# Patient Record
Sex: FEMALE | Race: BLACK OR AFRICAN AMERICAN | NOT HISPANIC OR LATINO | Employment: OTHER | URBAN - METROPOLITAN AREA
[De-identification: names, ages, dates, MRNs, and addresses within clinical notes are randomized per-mention and may not be internally consistent; named-entity substitution may affect disease eponyms.]

---

## 2023-03-14 LAB
ALBUMIN (G/DL) IN SER/PLAS: 4.1 G/DL (ref 3.4–5)
ANION GAP IN SER/PLAS: 14 MMOL/L (ref 10–20)
CALCIDIOL (25 OH VITAMIN D3) (NG/ML) IN SER/PLAS: 33 NG/ML
CALCIUM (MG/DL) IN SER/PLAS: 9.3 MG/DL (ref 8.6–10.6)
CARBON DIOXIDE, TOTAL (MMOL/L) IN SER/PLAS: 20 MMOL/L (ref 21–32)
CHLORIDE (MMOL/L) IN SER/PLAS: 100 MMOL/L (ref 98–107)
CREATININE (MG/DL) IN SER/PLAS: 1 MG/DL (ref 0.5–1.05)
ERYTHROCYTE DISTRIBUTION WIDTH (RATIO) BY AUTOMATED COUNT: 13.2 % (ref 11.5–14.5)
ERYTHROCYTE MEAN CORPUSCULAR HEMOGLOBIN CONCENTRATION (G/DL) BY AUTOMATED: 32.8 G/DL (ref 32–36)
ERYTHROCYTE MEAN CORPUSCULAR VOLUME (FL) BY AUTOMATED COUNT: 88 FL (ref 80–100)
ERYTHROCYTES (10*6/UL) IN BLOOD BY AUTOMATED COUNT: 3.96 X10E12/L (ref 4–5.2)
GFR FEMALE: 53 ML/MIN/1.73M2
GLUCOSE (MG/DL) IN SER/PLAS: 111 MG/DL (ref 74–99)
HEMATOCRIT (%) IN BLOOD BY AUTOMATED COUNT: 34.8 % (ref 36–46)
HEMOGLOBIN (G/DL) IN BLOOD: 11.4 G/DL (ref 12–16)
LEUKOCYTES (10*3/UL) IN BLOOD BY AUTOMATED COUNT: 9 X10E9/L (ref 4.4–11.3)
NRBC (PER 100 WBCS) BY AUTOMATED COUNT: 0 /100 WBC (ref 0–0)
PHOSPHATE (MG/DL) IN SER/PLAS: 2.7 MG/DL (ref 2.5–4.9)
PLATELETS (10*3/UL) IN BLOOD AUTOMATED COUNT: 415 X10E9/L (ref 150–450)
POTASSIUM (MMOL/L) IN SER/PLAS: 3.7 MMOL/L (ref 3.5–5.3)
SODIUM (MMOL/L) IN SER/PLAS: 130 MMOL/L (ref 136–145)
THYROTROPIN (MIU/L) IN SER/PLAS BY DETECTION LIMIT <= 0.05 MIU/L: 0.3 MIU/L (ref 0.44–3.98)
THYROXINE (T4) FREE (NG/DL) IN SER/PLAS: 1.08 NG/DL (ref 0.78–1.48)
TRIIODOTHYRONINE (T3) (NG/DL) IN SER/PLAS: 131 NG/DL (ref 60–200)
UREA NITROGEN (MG/DL) IN SER/PLAS: 6 MG/DL (ref 6–23)

## 2023-05-30 LAB
ALANINE AMINOTRANSFERASE (SGPT) (U/L) IN SER/PLAS: 8 U/L (ref 7–45)
ALBUMIN (G/DL) IN SER/PLAS: 4.2 G/DL (ref 3.4–5)
ALKALINE PHOSPHATASE (U/L) IN SER/PLAS: 101 U/L (ref 33–136)
ASPARTATE AMINOTRANSFERASE (SGOT) (U/L) IN SER/PLAS: 18 U/L (ref 9–39)
BILIRUBIN DIRECT (MG/DL) IN SER/PLAS: 0.1 MG/DL (ref 0–0.3)
BILIRUBIN TOTAL (MG/DL) IN SER/PLAS: 0.5 MG/DL (ref 0–1.2)
PROTEIN TOTAL: 7.2 G/DL (ref 6.4–8.2)
THYROTROPIN (MIU/L) IN SER/PLAS BY DETECTION LIMIT <= 0.05 MIU/L: 5.35 MIU/L (ref 0.44–3.98)
THYROXINE (T4) FREE (NG/DL) IN SER/PLAS: 0.88 NG/DL (ref 0.78–1.48)
TRIIODOTHYRONINE (T3) (NG/DL) IN SER/PLAS: 102 NG/DL (ref 60–200)

## 2023-09-05 ENCOUNTER — LAB (OUTPATIENT)
Dept: LAB | Facility: LAB | Age: 88
End: 2023-09-05
Payer: MEDICARE

## 2023-09-05 LAB
ALBUMIN (G/DL) IN SER/PLAS: 4 G/DL (ref 3.4–5)
ANION GAP IN SER/PLAS: 17 MMOL/L (ref 10–20)
CALCIUM (MG/DL) IN SER/PLAS: 9.5 MG/DL (ref 8.6–10.6)
CARBON DIOXIDE, TOTAL (MMOL/L) IN SER/PLAS: 19 MMOL/L (ref 21–32)
CHLORIDE (MMOL/L) IN SER/PLAS: 98 MMOL/L (ref 98–107)
CREATININE (MG/DL) IN SER/PLAS: 1.43 MG/DL (ref 0.5–1.05)
GFR FEMALE: 35 ML/MIN/1.73M2
GLUCOSE (MG/DL) IN SER/PLAS: 93 MG/DL (ref 74–99)
PHOSPHATE (MG/DL) IN SER/PLAS: 3.2 MG/DL (ref 2.5–4.9)
POTASSIUM (MMOL/L) IN SER/PLAS: 4.4 MMOL/L (ref 3.5–5.3)
SODIUM (MMOL/L) IN SER/PLAS: 130 MMOL/L (ref 136–145)
THYROTROPIN (MIU/L) IN SER/PLAS BY DETECTION LIMIT <= 0.05 MIU/L: 0.05 MIU/L (ref 0.44–3.98)
THYROXINE (T4) FREE (NG/DL) IN SER/PLAS: 1.28 NG/DL (ref 0.78–1.48)
UREA NITROGEN (MG/DL) IN SER/PLAS: 15 MG/DL (ref 6–23)

## 2023-10-13 PROBLEM — M85.80 OSTEOPENIA: Status: ACTIVE | Noted: 2023-10-13

## 2023-10-13 PROBLEM — E05.00 GRAVES DISEASE: Status: ACTIVE | Noted: 2023-10-13

## 2023-10-13 PROBLEM — G47.00 PERSISTENT INSOMNIA: Status: ACTIVE | Noted: 2023-10-13

## 2023-10-13 PROBLEM — I20.0 UNSTABLE ANGINA PECTORIS (MULTI): Status: ACTIVE | Noted: 2023-10-13

## 2023-10-13 PROBLEM — H61.20 IMPACTED CERUMEN: Status: ACTIVE | Noted: 2021-12-20

## 2023-10-13 PROBLEM — E05.00: Status: ACTIVE | Noted: 2021-11-29

## 2023-10-13 PROBLEM — D64.9 ANEMIA OF UNKNOWN ETIOLOGY: Status: ACTIVE | Noted: 2023-10-13

## 2023-10-13 PROBLEM — E03.2 HYPOTHYROIDISM DUE TO MEDICATION: Status: ACTIVE | Noted: 2023-10-13

## 2023-10-13 PROBLEM — E87.29 ACIDOSIS, HYPERCHLOREMIC: Status: ACTIVE | Noted: 2023-10-13

## 2023-10-13 PROBLEM — R63.4 ABNORMAL INTENTIONAL WEIGHT LOSS: Status: ACTIVE | Noted: 2023-10-13

## 2023-10-13 PROBLEM — R73.9 BORDERLINE HYPERGLYCEMIA: Status: ACTIVE | Noted: 2023-10-13

## 2023-10-13 PROBLEM — E05.20 TOXIC MULTINODULAR GOITER: Status: ACTIVE | Noted: 2023-10-13

## 2023-10-13 PROBLEM — E43 SEVERE PROTEIN-CALORIE MALNUTRITION (MULTI): Status: ACTIVE | Noted: 2022-01-21

## 2023-10-13 PROBLEM — R63.8 DECREASED ORAL INTAKE: Status: ACTIVE | Noted: 2023-10-13

## 2023-10-13 PROBLEM — E78.5 HYPERLIPIDEMIA: Status: ACTIVE | Noted: 2023-10-13

## 2023-10-13 PROBLEM — R00.1 BRADYCARDIA: Status: ACTIVE | Noted: 2023-10-13

## 2023-10-13 PROBLEM — E11.22 CHRONIC KIDNEY DISEASE DUE TO DIABETES MELLITUS (MULTI): Status: ACTIVE | Noted: 2021-11-29

## 2023-10-13 PROBLEM — R63.4 WEIGHT LOSS: Status: ACTIVE | Noted: 2023-10-13

## 2023-10-13 PROBLEM — I10 BENIGN ESSENTIAL HYPERTENSION: Status: ACTIVE | Noted: 2021-11-29

## 2023-10-13 PROBLEM — R26.9 GAIT DISTURBANCE: Status: ACTIVE | Noted: 2023-10-13

## 2023-10-13 PROBLEM — F01.50 VASCULAR DEMENTIA WITHOUT BEHAVIORAL DISTURBANCE (MULTI): Status: ACTIVE | Noted: 2023-10-13

## 2023-10-13 PROBLEM — E87.5 HYPERKALEMIA: Status: ACTIVE | Noted: 2023-10-13

## 2023-10-13 PROBLEM — G57.93 NEUROPATHY OF BOTH FEET: Status: ACTIVE | Noted: 2023-10-13

## 2023-10-13 PROBLEM — E55.9 VITAMIN D DEFICIENCY: Status: ACTIVE | Noted: 2023-10-13

## 2023-10-13 PROBLEM — H54.7 POOR VISION: Status: ACTIVE | Noted: 2023-10-13

## 2023-10-13 PROBLEM — N18.30 CHRONIC KIDNEY DISEASE (CKD), STAGE III (MODERATE) (MULTI): Status: ACTIVE | Noted: 2021-12-20

## 2023-10-13 RX ORDER — OFLOXACIN 3 MG/ML
1 SOLUTION/ DROPS OPHTHALMIC 4 TIMES DAILY
COMMUNITY
Start: 2016-06-07 | End: 2024-01-12 | Stop reason: ENTERED-IN-ERROR

## 2023-10-13 RX ORDER — ALCOHOL 70.47 ML/100ML
1 GEL TOPICAL DAILY
COMMUNITY
Start: 2021-12-01 | End: 2024-01-12 | Stop reason: ENTERED-IN-ERROR

## 2023-10-13 RX ORDER — ASPIRIN 81 MG/1
1 TABLET ORAL DAILY
COMMUNITY

## 2023-10-13 RX ORDER — LEVOTHYROXINE SODIUM 37.5 UG/ML
SOLUTION ORAL
COMMUNITY
Start: 2021-12-01 | End: 2024-01-12 | Stop reason: ENTERED-IN-ERROR

## 2023-10-13 RX ORDER — MULTIVIT,CALC,MINS/IRON/FOLIC 9MG-400MCG
1 TABLET ORAL DAILY
COMMUNITY
End: 2024-01-02

## 2023-10-13 RX ORDER — PREDNISOLONE ACETATE 10 MG/ML
1 SUSPENSION/ DROPS OPHTHALMIC 4 TIMES DAILY
COMMUNITY
Start: 2012-05-24 | End: 2024-01-12 | Stop reason: ENTERED-IN-ERROR

## 2023-10-13 RX ORDER — PILOCARPINE HYDROCHLORIDE 20 MG/ML
1 SOLUTION/ DROPS OPHTHALMIC DAILY
COMMUNITY
Start: 2012-12-06 | End: 2024-01-12 | Stop reason: ENTERED-IN-ERROR

## 2023-10-13 RX ORDER — PILOCARPINE HYDROCHLORIDE 10 MG/ML
1 SOLUTION/ DROPS OPHTHALMIC 4 TIMES DAILY
COMMUNITY
End: 2024-01-12 | Stop reason: ENTERED-IN-ERROR

## 2023-10-13 RX ORDER — TIMOLOL MALEATE 5 MG/ML
1 SOLUTION/ DROPS OPHTHALMIC DAILY
COMMUNITY
Start: 2020-05-29 | End: 2024-01-12 | Stop reason: ENTERED-IN-ERROR

## 2023-10-13 RX ORDER — METHIMAZOLE 5 MG/1
1 TABLET ORAL DAILY
COMMUNITY
End: 2024-02-01 | Stop reason: SDUPTHER

## 2023-10-13 RX ORDER — DIFLUPREDNATE OPHTHALMIC 0.5 MG/ML
1 EMULSION OPHTHALMIC 4 TIMES DAILY
COMMUNITY
Start: 2019-03-11 | End: 2024-01-12 | Stop reason: ENTERED-IN-ERROR

## 2023-10-13 RX ORDER — DEXAMETHASONE SODIUM PHOSPHATE 1 MG/ML
1 SOLUTION/ DROPS OPHTHALMIC
COMMUNITY
Start: 2019-04-26 | End: 2024-01-12 | Stop reason: ENTERED-IN-ERROR

## 2023-10-13 RX ORDER — TRAZODONE HYDROCHLORIDE 50 MG/1
50 TABLET ORAL NIGHTLY
COMMUNITY
End: 2024-01-12 | Stop reason: ENTERED-IN-ERROR

## 2023-10-13 RX ORDER — BIMATOPROST 0.1 MG/ML
SOLUTION/ DROPS OPHTHALMIC NIGHTLY
COMMUNITY
Start: 2012-02-12 | End: 2024-01-12 | Stop reason: ENTERED-IN-ERROR

## 2023-10-13 RX ORDER — BRIMONIDINE TARTRATE AND TIMOLOL MALEATE 2; 5 MG/ML; MG/ML
1 SOLUTION OPHTHALMIC 2 TIMES DAILY
COMMUNITY
Start: 2012-11-01 | End: 2024-01-12 | Stop reason: ENTERED-IN-ERROR

## 2023-10-13 RX ORDER — AMLODIPINE BESYLATE 5 MG/1
1.5 TABLET ORAL DAILY
COMMUNITY
End: 2023-10-16

## 2023-10-13 RX ORDER — MECLIZINE HCL 12.5 MG 12.5 MG/1
25 TABLET ORAL 3 TIMES DAILY
COMMUNITY
End: 2024-01-12 | Stop reason: ENTERED-IN-ERROR

## 2023-10-13 RX ORDER — LATANOPROST 50 UG/ML
1 SOLUTION/ DROPS OPHTHALMIC NIGHTLY
COMMUNITY
Start: 2016-09-21 | End: 2024-01-12 | Stop reason: ENTERED-IN-ERROR

## 2023-10-13 RX ORDER — LOTEPREDNOL ETABONATE 5 MG/ML
1 SUSPENSION/ DROPS OPHTHALMIC 4 TIMES DAILY
COMMUNITY
Start: 2012-05-14 | End: 2024-01-12 | Stop reason: ENTERED-IN-ERROR

## 2023-10-13 RX ORDER — LEVOTHYROXINE SODIUM 25 UG/1
50 TABLET ORAL DAILY
COMMUNITY
Start: 2022-03-29

## 2023-10-13 RX ORDER — FLUTICASONE PROPIONATE 50 MCG
2 SPRAY, SUSPENSION (ML) NASAL DAILY
COMMUNITY
Start: 2023-03-30 | End: 2024-01-12 | Stop reason: ENTERED-IN-ERROR

## 2023-10-13 RX ORDER — MIRTAZAPINE 15 MG/1
1 TABLET, FILM COATED ORAL NIGHTLY
COMMUNITY
Start: 2021-12-01 | End: 2024-01-12 | Stop reason: ENTERED-IN-ERROR

## 2023-10-14 DIAGNOSIS — I10 ESSENTIAL (PRIMARY) HYPERTENSION: ICD-10-CM

## 2023-10-16 RX ORDER — AMLODIPINE BESYLATE 5 MG/1
7.5 TABLET ORAL DAILY
Qty: 135 TABLET | Refills: 3 | Status: SHIPPED | OUTPATIENT
Start: 2023-10-16

## 2023-12-11 ENCOUNTER — TELEPHONE (OUTPATIENT)
Dept: GERIATRIC MEDICINE | Facility: CLINIC | Age: 88
End: 2023-12-11
Payer: MEDICARE

## 2023-12-11 ENCOUNTER — HOSPITAL ENCOUNTER (EMERGENCY)
Facility: HOSPITAL | Age: 88
Discharge: HOME | End: 2023-12-11
Attending: EMERGENCY MEDICINE
Payer: MEDICARE

## 2023-12-11 VITALS
HEART RATE: 74 BPM | OXYGEN SATURATION: 98 % | TEMPERATURE: 97.9 F | WEIGHT: 111 LBS | DIASTOLIC BLOOD PRESSURE: 82 MMHG | BODY MASS INDEX: 32.5 KG/M2 | RESPIRATION RATE: 16 BRPM | SYSTOLIC BLOOD PRESSURE: 155 MMHG

## 2023-12-11 DIAGNOSIS — I95.1 ORTHOSTASIS: Primary | ICD-10-CM

## 2023-12-11 LAB
ALBUMIN SERPL BCP-MCNC: 4.1 G/DL (ref 3.4–5)
ALP SERPL-CCNC: 109 U/L (ref 33–136)
ALT SERPL W P-5'-P-CCNC: 9 U/L (ref 7–45)
ANION GAP SERPL CALC-SCNC: 16 MMOL/L (ref 10–20)
AST SERPL W P-5'-P-CCNC: 21 U/L (ref 9–39)
BASOPHILS # BLD AUTO: 0.04 X10*3/UL (ref 0–0.1)
BASOPHILS NFR BLD AUTO: 0.5 %
BILIRUB SERPL-MCNC: 0.3 MG/DL (ref 0–1.2)
BUN SERPL-MCNC: 15 MG/DL (ref 6–23)
CALCIUM SERPL-MCNC: 9.5 MG/DL (ref 8.6–10.6)
CARDIAC TROPONIN I PNL SERPL HS: 7 NG/L (ref 0–34)
CHLORIDE SERPL-SCNC: 109 MMOL/L (ref 98–107)
CO2 SERPL-SCNC: 17 MMOL/L (ref 21–32)
CREAT SERPL-MCNC: 1.2 MG/DL (ref 0.5–1.05)
EOSINOPHIL # BLD AUTO: 0.26 X10*3/UL (ref 0–0.4)
EOSINOPHIL NFR BLD AUTO: 3.5 %
ERYTHROCYTE [DISTWIDTH] IN BLOOD BY AUTOMATED COUNT: 13.8 % (ref 11.5–14.5)
GFR SERPL CREATININE-BSD FRML MDRD: 43 ML/MIN/1.73M*2
GLUCOSE SERPL-MCNC: 103 MG/DL (ref 74–99)
HCT VFR BLD AUTO: 32.3 % (ref 36–46)
HGB BLD-MCNC: 11.3 G/DL (ref 12–16)
IMM GRANULOCYTES # BLD AUTO: 0.01 X10*3/UL (ref 0–0.5)
IMM GRANULOCYTES NFR BLD AUTO: 0.1 % (ref 0–0.9)
LYMPHOCYTES # BLD AUTO: 2.71 X10*3/UL (ref 0.8–3)
LYMPHOCYTES NFR BLD AUTO: 36.1 %
MAGNESIUM SERPL-MCNC: 2.31 MG/DL (ref 1.6–2.4)
MCH RBC QN AUTO: 28.8 PG (ref 26–34)
MCHC RBC AUTO-ENTMCNC: 35 G/DL (ref 32–36)
MCV RBC AUTO: 82 FL (ref 80–100)
MONOCYTES # BLD AUTO: 0.86 X10*3/UL (ref 0.05–0.8)
MONOCYTES NFR BLD AUTO: 11.5 %
NEUTROPHILS # BLD AUTO: 3.63 X10*3/UL (ref 1.6–5.5)
NEUTROPHILS NFR BLD AUTO: 48.3 %
NRBC BLD-RTO: 0 /100 WBCS (ref 0–0)
PLATELET # BLD AUTO: 318 X10*3/UL (ref 150–450)
POTASSIUM SERPL-SCNC: 4.8 MMOL/L (ref 3.5–5.3)
PROT SERPL-MCNC: 7.2 G/DL (ref 6.4–8.2)
RBC # BLD AUTO: 3.93 X10*6/UL (ref 4–5.2)
SODIUM SERPL-SCNC: 137 MMOL/L (ref 136–145)
T4 FREE SERPL-MCNC: 1.19 NG/DL (ref 0.78–1.48)
TSH SERPL-ACNC: 0.03 MIU/L (ref 0.44–3.98)
WBC # BLD AUTO: 7.5 X10*3/UL (ref 4.4–11.3)

## 2023-12-11 PROCEDURE — 2500000004 HC RX 250 GENERAL PHARMACY W/ HCPCS (ALT 636 FOR OP/ED): Performed by: STUDENT IN AN ORGANIZED HEALTH CARE EDUCATION/TRAINING PROGRAM

## 2023-12-11 PROCEDURE — 83735 ASSAY OF MAGNESIUM: CPT | Performed by: EMERGENCY MEDICINE

## 2023-12-11 PROCEDURE — 99284 EMERGENCY DEPT VISIT MOD MDM: CPT | Mod: 25 | Performed by: EMERGENCY MEDICINE

## 2023-12-11 PROCEDURE — 36415 COLL VENOUS BLD VENIPUNCTURE: CPT | Performed by: EMERGENCY MEDICINE

## 2023-12-11 PROCEDURE — 80048 BASIC METABOLIC PNL TOTAL CA: CPT | Performed by: EMERGENCY MEDICINE

## 2023-12-11 PROCEDURE — 96360 HYDRATION IV INFUSION INIT: CPT

## 2023-12-11 PROCEDURE — 82040 ASSAY OF SERUM ALBUMIN: CPT | Performed by: EMERGENCY MEDICINE

## 2023-12-11 PROCEDURE — 85025 COMPLETE CBC W/AUTO DIFF WBC: CPT | Performed by: EMERGENCY MEDICINE

## 2023-12-11 PROCEDURE — 84439 ASSAY OF FREE THYROXINE: CPT | Performed by: EMERGENCY MEDICINE

## 2023-12-11 PROCEDURE — 84484 ASSAY OF TROPONIN QUANT: CPT | Performed by: EMERGENCY MEDICINE

## 2023-12-11 PROCEDURE — 99283 EMERGENCY DEPT VISIT LOW MDM: CPT | Mod: 25

## 2023-12-11 PROCEDURE — 80053 COMPREHEN METABOLIC PANEL: CPT | Performed by: EMERGENCY MEDICINE

## 2023-12-11 PROCEDURE — 99285 EMERGENCY DEPT VISIT HI MDM: CPT | Performed by: EMERGENCY MEDICINE

## 2023-12-11 PROCEDURE — 84443 ASSAY THYROID STIM HORMONE: CPT | Performed by: EMERGENCY MEDICINE

## 2023-12-11 RX ADMIN — SODIUM CHLORIDE, SODIUM LACTATE, POTASSIUM CHLORIDE, AND CALCIUM CHLORIDE 1000 ML: 600; 310; 30; 20 INJECTION, SOLUTION INTRAVENOUS at 16:55

## 2023-12-11 ASSESSMENT — COLUMBIA-SUICIDE SEVERITY RATING SCALE - C-SSRS
6. HAVE YOU EVER DONE ANYTHING, STARTED TO DO ANYTHING, OR PREPARED TO DO ANYTHING TO END YOUR LIFE?: NO
2. HAVE YOU ACTUALLY HAD ANY THOUGHTS OF KILLING YOURSELF?: NO
1. IN THE PAST MONTH, HAVE YOU WISHED YOU WERE DEAD OR WISHED YOU COULD GO TO SLEEP AND NOT WAKE UP?: NO

## 2023-12-11 ASSESSMENT — PAIN - FUNCTIONAL ASSESSMENT: PAIN_FUNCTIONAL_ASSESSMENT: 0-10

## 2023-12-11 ASSESSMENT — PAIN SCALES - GENERAL: PAINLEVEL_OUTOF10: 0 - NO PAIN

## 2023-12-11 NOTE — TELEPHONE ENCOUNTER
Spoke with Pt's son (Joaquin) and daughter in law  (Mary) over the phone and blood pressure today 134/61 this morning and around 11:20 am b/p 142/66. Pt. With c/o feeling dizzy and lightheaded. Pt. takes amlodipine for blood pressure and per family Pt. is asking to go to the hospital and I advised family they can take her to the hospital of their choice. Family would like to know if you have any openings today to see Patient at the [facility].    Called and spoke with Pt's son and DAWITElias Ricarda CNP did not have an openings and per [facility] Pt was taken to the ER. Son states his sister took Pt to the main campus.

## 2023-12-11 NOTE — ED PROVIDER NOTES
History of Present Illness   CC: Dizziness and Weakness, Gen     History provided by: Patient  Limitations to History: None    HPI:  Jamila Eric is a 89 y.o. female with history of CKD, hypertension, mild hypokalemic metabolic acidosis on bicarb tabs, Graves' disease on levothyroxine resenting to the emergency department with 2 episodes of acute dizziness.  Since last night she got up to try and go to the bathroom got dizzy, laid down and her dizziness resolved.  This happened again this morning.  She decided come to the emergency department.  In the ED, she has not had recurrence of her dizziness even with standing up.  Denies any headache, chest pain, shortness of breath, abdominal pain, nausea, vomiting.  Currently feels well, at her baseline.  Unsure what medicine she takes.  Denies any allergies.    External Records Reviewed: Reviewed hospitalization in September 2023 for urinary retention.    Physical Exam   Triage vitals:  T 36.6 °C (97.9 °F)  HR 83  /70  RR 18  O2 98 % None (Room air)    Vital signs reviewed in nursing triage note, EMR flow sheets, and at patient's bedside.   General: Awake, alert, in no acute distress  Eyes: Gaze conjugate.  No scleral icterus or injection  HENT: Normo-cephalic, atraumatic. No stridor.  CV: Regular rate, Regular rhythm. Radial pulses 2+ bilaterally.  No clinical orthostasis upon standing.  Resp: Breathing non-labored, speaking in full sentences.  Clear to auscultation bilaterally  GI: Soft, non-distended, non-tender. No rebound or guarding.  MSK/Extremities: No gross bony deformities. Moving all extremities  Skin: Warm. Appropriate color  Neuro: Alert, oriented.  Speech fluent and non-dysarthric. Cranial nerves II through XII intact and symmetric bilaterally.  Full strength and sensation to light touch intact in the bilateral upper and lower extremities.  No ataxia on finger-to-nose bilaterally.  No truncal ataxia.  Patient ambulates with a non-ataxic gait.     Psych: Appropriate mood and affect    ED Course & Medical Decision Making   ED Course:  ED Course as of 12/12/23 1131   Mon Dec 11, 2023   1651 Comprehensive Metabolic Panel(!)  Mild metabolic acidosis with hyperchloremia.  Creatinine at baseline [SH]   1651 Magnesium  Mag normal [SH]   1651 Troponin I, High Sensitivity  Troponin normal [SH]   1651 CBC with Differential(!)  Blood cell count normal, mild anemia, normal platelets [SH]   1652 TSH with reflex to Free T4 if abnormal(!)  TSH is low consistent with known Graves' disease, currently awaiting free T4 level.  All [SH]   1703 Thyroxine, Free  Free T4 normal [SH]   1705 ECG 12 lead  EKG obtained at 1212 demonstrate normal sinus rhythm with rate of 71, normal axis, normal intervals.  T wave inversions noted in lead I, aVL, and V4 through V6.  These are unchanged compared to prior EKG from January 2022.  No ST segment elevations or depressions. [SH]      ED Course User Index  [SH] eJan Salcedo MD         Diagnoses as of 12/12/23 1131   Orthostasis       Differential diagnoses considered include but are not limited to: Orthostasis, dehydration, JOSÉ MIGUEL, posterior circulation stroke, near syncope    Social Determinants Limiting Care: None identified    MDM:  89 y.o. female presenting to the emergency department with 2 episodes of acute dizziness earlier today.  On arrival vital signs within normal limits, patient without any symptoms at the time my assessment.  Clinically without orthostasis, able to get out of bed and ambulate without difficulty.  Had labs obtained in triage which are reviewed as above, notable for low TSH concerning for medication noncompliance.  Does not have findings consistent with thyrotoxicosis at this time.  Denies no nausea, vomiting, diarrhea.  Does have mild acidosis with hyper chloremia on labs.  Will provide 1 L of IV fluids.     Remained stable in the emergency department, remained not orthostatic.  Tolerated IV fluids, continue to  feel well after this.  Requesting discharge home.  Feel this is appropriate.  Offered potential admission for near syncope workup which she declined.  Will discharge her home with instructions to follow-up with primary care doctor within this week.  Discussed return precautions.  Discharged in stable condition.    Disposition   As a result of the work-up, patient was discharged home.  They were informed of their diagnosis and instructed to come back with any concerns or worsening of condition and was agreeable to the plan as discussed above.  The patient was given the opportunity to ask questions.  All of the patient's questions were answered.  The patient remained stable under my care.    Procedures   Procedures    Patient seen and discussed with ED attending physician.    Jovi Salcedo MD  PGY 3 Emergency Medicine         Jean Salcedo MD  Resident  12/12/23 1100

## 2023-12-11 NOTE — DISCHARGE INSTRUCTIONS
Please be sure to stay well-hydrated.  Return to the ED if with any return of your symptoms.  Please follow-up with your regular doctor next week.

## 2023-12-31 DIAGNOSIS — I10 BENIGN ESSENTIAL HYPERTENSION: Primary | ICD-10-CM

## 2024-01-02 RX ORDER — MULTIVIT,CALC,MINS/IRON/FOLIC 9MG-400MCG
1 TABLET ORAL DAILY
Qty: 90 TABLET | Refills: 3 | Status: SHIPPED | OUTPATIENT
Start: 2024-01-02

## 2024-01-03 ENCOUNTER — HOSPITAL ENCOUNTER (EMERGENCY)
Facility: HOSPITAL | Age: 89
Discharge: HOME | End: 2024-01-03
Attending: STUDENT IN AN ORGANIZED HEALTH CARE EDUCATION/TRAINING PROGRAM
Payer: MEDICARE

## 2024-01-03 VITALS
TEMPERATURE: 97.9 F | DIASTOLIC BLOOD PRESSURE: 74 MMHG | OXYGEN SATURATION: 97 % | RESPIRATION RATE: 17 BRPM | WEIGHT: 120 LBS | SYSTOLIC BLOOD PRESSURE: 189 MMHG | HEIGHT: 60 IN | HEART RATE: 85 BPM | BODY MASS INDEX: 23.56 KG/M2

## 2024-01-03 DIAGNOSIS — N39.0 URINARY TRACT INFECTION WITHOUT HEMATURIA, SITE UNSPECIFIED: Primary | ICD-10-CM

## 2024-01-03 LAB
ALBUMIN SERPL BCP-MCNC: 4.4 G/DL (ref 3.4–5)
ALP SERPL-CCNC: 111 U/L (ref 33–136)
ALT SERPL W P-5'-P-CCNC: 7 U/L (ref 7–45)
ANION GAP SERPL CALC-SCNC: 13 MMOL/L (ref 10–20)
APPEARANCE UR: CLEAR
AST SERPL W P-5'-P-CCNC: 17 U/L (ref 9–39)
BACTERIA #/AREA URNS AUTO: ABNORMAL /HPF
BASOPHILS # BLD AUTO: 0.03 X10*3/UL (ref 0–0.1)
BASOPHILS NFR BLD AUTO: 0.4 %
BILIRUB SERPL-MCNC: 0.4 MG/DL (ref 0–1.2)
BILIRUB UR STRIP.AUTO-MCNC: NEGATIVE MG/DL
BUN SERPL-MCNC: 13 MG/DL (ref 6–23)
CALCIUM SERPL-MCNC: 10.3 MG/DL (ref 8.6–10.6)
CHLORIDE SERPL-SCNC: 103 MMOL/L (ref 98–107)
CO2 SERPL-SCNC: 18 MMOL/L (ref 21–32)
COLOR UR: ABNORMAL
CREAT SERPL-MCNC: 1.14 MG/DL (ref 0.5–1.05)
EOSINOPHIL # BLD AUTO: 0.14 X10*3/UL (ref 0–0.4)
EOSINOPHIL NFR BLD AUTO: 2.1 %
ERYTHROCYTE [DISTWIDTH] IN BLOOD BY AUTOMATED COUNT: 13.7 % (ref 11.5–14.5)
GFR SERPL CREATININE-BSD FRML MDRD: 46 ML/MIN/1.73M*2
GLUCOSE SERPL-MCNC: 105 MG/DL (ref 74–99)
GLUCOSE UR STRIP.AUTO-MCNC: NEGATIVE MG/DL
HCT VFR BLD AUTO: 35.1 % (ref 36–46)
HGB BLD-MCNC: 12.4 G/DL (ref 12–16)
IMM GRANULOCYTES # BLD AUTO: 0.02 X10*3/UL (ref 0–0.5)
IMM GRANULOCYTES NFR BLD AUTO: 0.3 % (ref 0–0.9)
KETONES UR STRIP.AUTO-MCNC: NEGATIVE MG/DL
LEUKOCYTE ESTERASE UR QL STRIP.AUTO: ABNORMAL
LYMPHOCYTES # BLD AUTO: 2.86 X10*3/UL (ref 0.8–3)
LYMPHOCYTES NFR BLD AUTO: 42.4 %
MAGNESIUM SERPL-MCNC: 2.09 MG/DL (ref 1.6–2.4)
MCH RBC QN AUTO: 28.8 PG (ref 26–34)
MCHC RBC AUTO-ENTMCNC: 35.3 G/DL (ref 32–36)
MCV RBC AUTO: 81 FL (ref 80–100)
MONOCYTES # BLD AUTO: 0.61 X10*3/UL (ref 0.05–0.8)
MONOCYTES NFR BLD AUTO: 9.1 %
NEUTROPHILS # BLD AUTO: 3.08 X10*3/UL (ref 1.6–5.5)
NEUTROPHILS NFR BLD AUTO: 45.7 %
NITRITE UR QL STRIP.AUTO: NEGATIVE
NRBC BLD-RTO: 0 /100 WBCS (ref 0–0)
PH UR STRIP.AUTO: 6 [PH]
PLATELET # BLD AUTO: 333 X10*3/UL (ref 150–450)
POTASSIUM SERPL-SCNC: 4.4 MMOL/L (ref 3.5–5.3)
PROT SERPL-MCNC: 7.5 G/DL (ref 6.4–8.2)
PROT UR STRIP.AUTO-MCNC: NEGATIVE MG/DL
RBC # BLD AUTO: 4.31 X10*6/UL (ref 4–5.2)
RBC # UR STRIP.AUTO: NEGATIVE /UL
RBC #/AREA URNS AUTO: ABNORMAL /HPF
SODIUM SERPL-SCNC: 130 MMOL/L (ref 136–145)
SP GR UR STRIP.AUTO: 1.01
SQUAMOUS #/AREA URNS AUTO: ABNORMAL /HPF
UROBILINOGEN UR STRIP.AUTO-MCNC: <2 MG/DL
WBC # BLD AUTO: 6.7 X10*3/UL (ref 4.4–11.3)
WBC #/AREA URNS AUTO: ABNORMAL /HPF

## 2024-01-03 PROCEDURE — 83735 ASSAY OF MAGNESIUM: CPT | Performed by: STUDENT IN AN ORGANIZED HEALTH CARE EDUCATION/TRAINING PROGRAM

## 2024-01-03 PROCEDURE — 85025 COMPLETE CBC W/AUTO DIFF WBC: CPT | Performed by: STUDENT IN AN ORGANIZED HEALTH CARE EDUCATION/TRAINING PROGRAM

## 2024-01-03 PROCEDURE — 99283 EMERGENCY DEPT VISIT LOW MDM: CPT

## 2024-01-03 PROCEDURE — 99284 EMERGENCY DEPT VISIT MOD MDM: CPT | Performed by: STUDENT IN AN ORGANIZED HEALTH CARE EDUCATION/TRAINING PROGRAM

## 2024-01-03 PROCEDURE — 36415 COLL VENOUS BLD VENIPUNCTURE: CPT | Performed by: STUDENT IN AN ORGANIZED HEALTH CARE EDUCATION/TRAINING PROGRAM

## 2024-01-03 PROCEDURE — 81001 URINALYSIS AUTO W/SCOPE: CPT | Performed by: STUDENT IN AN ORGANIZED HEALTH CARE EDUCATION/TRAINING PROGRAM

## 2024-01-03 PROCEDURE — 80053 COMPREHEN METABOLIC PANEL: CPT | Performed by: STUDENT IN AN ORGANIZED HEALTH CARE EDUCATION/TRAINING PROGRAM

## 2024-01-03 RX ORDER — SULFAMETHOXAZOLE AND TRIMETHOPRIM 800; 160 MG/1; MG/1
1 TABLET ORAL 2 TIMES DAILY
Qty: 28 TABLET | Refills: 0 | Status: SHIPPED | OUTPATIENT
Start: 2024-01-03 | End: 2024-01-13 | Stop reason: SINTOL

## 2024-01-03 RX ORDER — SULFAMETHOXAZOLE AND TRIMETHOPRIM 800; 160 MG/1; MG/1
1 TABLET ORAL ONCE
Status: DISCONTINUED | OUTPATIENT
Start: 2024-01-03 | End: 2024-01-03 | Stop reason: HOSPADM

## 2024-01-03 ASSESSMENT — COLUMBIA-SUICIDE SEVERITY RATING SCALE - C-SSRS
1. IN THE PAST MONTH, HAVE YOU WISHED YOU WERE DEAD OR WISHED YOU COULD GO TO SLEEP AND NOT WAKE UP?: NO
6. HAVE YOU EVER DONE ANYTHING, STARTED TO DO ANYTHING, OR PREPARED TO DO ANYTHING TO END YOUR LIFE?: NO
2. HAVE YOU ACTUALLY HAD ANY THOUGHTS OF KILLING YOURSELF?: NO

## 2024-01-03 NOTE — ED PROVIDER NOTES
HPI  Patient is a 89-year-old female with PMH of hypokalemic metabolic acidosis, HTN, CKD, Graves' disease, and history of urinary retention presenting to the emergency department for urinary retention.  Patient states that she has had 1 episode in the past where she had to have an intermittent catheter placed.  Has been able to follow-up with urology, however no abnormalities so far on her workup.  States that she had difficulty urinating today and did not feel that she was able to empty it prompting her presentation.  She denies any abdominal pain, nausea, vomiting, subjective fevers, chest pain, or shortness of breath.  Denies any numbness/tingle/weakness in her lower extremities.    Physical Exam  VITALS: Vital signs reviewed in nursing triage note, EMR flow sheets, and at patient's bedside  CONSTITUTIONAL: Well-appearing, in no apparent distress  HEAD: NCAT  EYES: PERRL, EOMI  NECK: Supple, non-tender, full ROM  CARD: RRR, no m/r/g, no JVD  RESP: LCTAB, speaking full sentences, no increased work of breathing, no use of accessory respiratory muscles  ABD: Bowel sounds present, non-distended, soft and non-tender, no palpable organomegaly, no masses, no guarding or rebound tenderness  BACK: No vertebral point or CVA tenderness, no obvious bony deformities, no spinal step-offs   EXT: Normal ROM in all four extremities, 2+ radial and DP pulses bilaterally, no edema  SKIN: Dry with appropriate turgor, no apparent rashes, suspicious lesions, or masses   NEURO: CNs II-XII grossly intact, AAOx4, sensation intact bilaterally, strength 5/5 on UEs and LEs bilaterally, speech is fluent and comprehensible  PSYCH: Appropriate mood and affect, no HI/SI, not responding to internal stimuli    Vitals:    01/03/24 1803   BP: (!) 189/74   Pulse: 85   Resp: 17   Temp:    SpO2: 97%       Assessment/Plan/MDM  Patient clinically stable with normal vital signs upon presentation to the emergency department.  While in triage, patient  actually was able to urinate.  Unfortunately were not able to get a urine sample at that time.  We did perform a postvoid residual which did demonstrate only 47 mL in the bladder.  Given her age and potential comorbidities, full laboratory workup including CBC, CMP, Agnesian, as well as UA to rule out any evidence of UTI causing her symptoms today.  Laboratory workup is notable for moderate leukocyte esterase as well as WBCs and pyuria that is suggestive of UTI.  Will provide with Bactrim and provided prescription.  Patient was able to urinate 1 more time while in the emergency department.  Encouraged her to follow-up with urology.  She understands these discharge instructions, and was provided with return precautions.  Will be discharged stable condition.    Results  *See section(s) entitled ``Lab Results´´, ``Diagnostic Imaging Results Review´´ for entirety.  Notable results listed below  - Labs: I independently interpreted as UA demonstrates moderate leukocyte esterase, 6-10 WBC, and 1+ bacteria, remainder of laboratory workup unremarkable    ED Course as of 01/03/24 1808 Wed Jan 03, 2024   1806 Leukocyte Esterase, Urine(!): MODERATE (2+) [JV]   1806 WBC, Urine(!): 6-10 [JV]   1806 Bacteria, Urine(!): 1+ [JV]      ED Course User Index  [JV] Jh Singleton MD         Diagnoses as of 01/03/24 1808   Urinary tract infection without hematuria, site unspecified      Clinical Impression  UTI    Dispo  Discharge home    Home: I discussed the differential, results and discharge plan with the patient and/or family/friend/caregiver if present. I emphasized the importance of follow-up with the physician I referred them to in the timeframe recommended. I explained reasons for the patient to return to the Emergency Department. Questions were addressed. They understand return precautions and discharge instructions. The patient and/or family/friend/caregiver expressed understanding and agreement with assessment/plan.      Patient seen and discussed with attending physician Dr. Blanca.    Jh Singleton MD  Emergency Medicine, PGY-3    Was dictated using Dragon dictation. Please excuse any errors found in the note.     Jh Singleton MD  Resident  01/03/24 8449

## 2024-01-03 NOTE — ED TRIAGE NOTES
Pt reports she is not urinating like normal. Denies pain. States she was able to urinate a little this morning but all of it wouldn't come out. This has happened to the pt before and they had to put a temporary catheter in.

## 2024-01-04 LAB — HOLD SPECIMEN: NORMAL

## 2024-01-12 ENCOUNTER — CLINICAL SUPPORT (OUTPATIENT)
Dept: EMERGENCY MEDICINE | Facility: HOSPITAL | Age: 89
End: 2024-01-12
Payer: MEDICARE

## 2024-01-12 ENCOUNTER — APPOINTMENT (OUTPATIENT)
Dept: RADIOLOGY | Facility: HOSPITAL | Age: 89
End: 2024-01-12
Payer: MEDICARE

## 2024-01-12 ENCOUNTER — HOSPITAL ENCOUNTER (OUTPATIENT)
Facility: HOSPITAL | Age: 89
Setting detail: OBSERVATION
Discharge: HOME | End: 2024-01-13
Attending: STUDENT IN AN ORGANIZED HEALTH CARE EDUCATION/TRAINING PROGRAM | Admitting: NURSE PRACTITIONER
Payer: MEDICARE

## 2024-01-12 DIAGNOSIS — N17.9 ACUTE RENAL FAILURE, UNSPECIFIED ACUTE RENAL FAILURE TYPE (CMS-HCC): ICD-10-CM

## 2024-01-12 DIAGNOSIS — E87.5 HYPERKALEMIA: Primary | ICD-10-CM

## 2024-01-12 DIAGNOSIS — R19.7 DIARRHEA, UNSPECIFIED TYPE: ICD-10-CM

## 2024-01-12 LAB
ALBUMIN SERPL BCP-MCNC: 4.4 G/DL (ref 3.4–5)
ALP SERPL-CCNC: 90 U/L (ref 33–136)
ALT SERPL W P-5'-P-CCNC: 10 U/L (ref 7–45)
ANION GAP BLDV CALCULATED.4IONS-SCNC: 15 MMOL/L (ref 10–25)
ANION GAP SERPL CALC-SCNC: 18 MMOL/L (ref 10–20)
AST SERPL W P-5'-P-CCNC: 22 U/L (ref 9–39)
ATRIAL RATE: 71 BPM
BASE EXCESS BLDV CALC-SCNC: -8 MMOL/L (ref -2–3)
BASOPHILS # BLD AUTO: 0.02 X10*3/UL (ref 0–0.1)
BASOPHILS NFR BLD AUTO: 0.3 %
BILIRUB SERPL-MCNC: 0.4 MG/DL (ref 0–1.2)
BODY TEMPERATURE: 37 DEGREES CELSIUS
BUN SERPL-MCNC: 27 MG/DL (ref 6–23)
CA-I BLDV-SCNC: 1.47 MMOL/L (ref 1.1–1.33)
CALCIUM SERPL-MCNC: 9.9 MG/DL (ref 8.6–10.6)
CARDIAC TROPONIN I PNL SERPL HS: 18 NG/L (ref 0–34)
CARDIAC TROPONIN I PNL SERPL HS: 20 NG/L (ref 0–34)
CHLORIDE BLDV-SCNC: 108 MMOL/L (ref 98–107)
CHLORIDE SERPL-SCNC: 110 MMOL/L (ref 98–107)
CO2 SERPL-SCNC: 13 MMOL/L (ref 21–32)
CREAT SERPL-MCNC: 2.32 MG/DL (ref 0.5–1.05)
EGFRCR SERPLBLD CKD-EPI 2021: 19 ML/MIN/1.73M*2
EOSINOPHIL # BLD AUTO: 0.01 X10*3/UL (ref 0–0.4)
EOSINOPHIL NFR BLD AUTO: 0.2 %
ERYTHROCYTE [DISTWIDTH] IN BLOOD BY AUTOMATED COUNT: 15.9 % (ref 11.5–14.5)
FLUAV RNA RESP QL NAA+PROBE: NOT DETECTED
FLUBV RNA RESP QL NAA+PROBE: NOT DETECTED
GLUCOSE BLDV-MCNC: 81 MG/DL (ref 74–99)
GLUCOSE SERPL-MCNC: 99 MG/DL (ref 74–99)
HCO3 BLDV-SCNC: 16.7 MMOL/L (ref 22–26)
HCT VFR BLD AUTO: 39.4 % (ref 36–46)
HCT VFR BLD EST: 37 % (ref 36–46)
HGB BLD-MCNC: 13.4 G/DL (ref 12–16)
HGB BLDV-MCNC: 12.3 G/DL (ref 12–16)
IMM GRANULOCYTES # BLD AUTO: 0.02 X10*3/UL (ref 0–0.5)
IMM GRANULOCYTES NFR BLD AUTO: 0.3 % (ref 0–0.9)
INHALED O2 CONCENTRATION: 21 %
LACTATE BLDV-SCNC: 1.6 MMOL/L (ref 0.4–2)
LYMPHOCYTES # BLD AUTO: 2.32 X10*3/UL (ref 0.8–3)
LYMPHOCYTES NFR BLD AUTO: 36.9 %
MAGNESIUM SERPL-MCNC: 2.18 MG/DL (ref 1.6–2.4)
MCH RBC QN AUTO: 28 PG (ref 26–34)
MCHC RBC AUTO-ENTMCNC: 34 G/DL (ref 32–36)
MCV RBC AUTO: 82 FL (ref 80–100)
MONOCYTES # BLD AUTO: 0.52 X10*3/UL (ref 0.05–0.8)
MONOCYTES NFR BLD AUTO: 8.3 %
NEUTROPHILS # BLD AUTO: 3.4 X10*3/UL (ref 1.6–5.5)
NEUTROPHILS NFR BLD AUTO: 54 %
NRBC BLD-RTO: 0 /100 WBCS (ref 0–0)
OXYHGB MFR BLDV: 91.6 % (ref 45–75)
P AXIS: 56 DEGREES
P OFFSET: 193 MS
P ONSET: 145 MS
PCO2 BLDV: 31 MM HG (ref 41–51)
PH BLDV: 7.34 PH (ref 7.33–7.43)
PHOSPHATE SERPL-MCNC: 3.3 MG/DL (ref 2.5–4.9)
PLATELET # BLD AUTO: 294 X10*3/UL (ref 150–450)
PO2 BLDV: 65 MM HG (ref 35–45)
POTASSIUM BLDV-SCNC: 6.3 MMOL/L (ref 3.5–5.3)
POTASSIUM SERPL-SCNC: 6.7 MMOL/L (ref 3.5–5.3)
POTASSIUM SERPL-SCNC: 7 MMOL/L (ref 3.5–5.3)
PR INTERVAL: 168 MS
PROT SERPL-MCNC: 7.9 G/DL (ref 6.4–8.2)
Q ONSET: 229 MS
QRS COUNT: 12 BEATS
QRS DURATION: 64 MS
QT INTERVAL: 358 MS
QTC CALCULATION(BAZETT): 389 MS
QTC FREDERICIA: 378 MS
R AXIS: 37 DEGREES
RBC # BLD AUTO: 4.79 X10*6/UL (ref 4–5.2)
SAO2 % BLDV: 94 % (ref 45–75)
SARS-COV-2 RNA RESP QL NAA+PROBE: NOT DETECTED
SODIUM BLDV-SCNC: 133 MMOL/L (ref 136–145)
SODIUM SERPL-SCNC: 134 MMOL/L (ref 136–145)
T AXIS: 160 DEGREES
T OFFSET: 408 MS
VENTRICULAR RATE: 71 BPM
WBC # BLD AUTO: 6.3 X10*3/UL (ref 4.4–11.3)

## 2024-01-12 PROCEDURE — 2500000004 HC RX 250 GENERAL PHARMACY W/ HCPCS (ALT 636 FOR OP/ED): Performed by: STUDENT IN AN ORGANIZED HEALTH CARE EDUCATION/TRAINING PROGRAM

## 2024-01-12 PROCEDURE — 2500000005 HC RX 250 GENERAL PHARMACY W/O HCPCS: Performed by: STUDENT IN AN ORGANIZED HEALTH CARE EDUCATION/TRAINING PROGRAM

## 2024-01-12 PROCEDURE — 82947 ASSAY GLUCOSE BLOOD QUANT: CPT | Mod: 59

## 2024-01-12 PROCEDURE — 84132 ASSAY OF SERUM POTASSIUM: CPT | Performed by: STUDENT IN AN ORGANIZED HEALTH CARE EDUCATION/TRAINING PROGRAM

## 2024-01-12 PROCEDURE — 70450 CT HEAD/BRAIN W/O DYE: CPT

## 2024-01-12 PROCEDURE — 81003 URINALYSIS AUTO W/O SCOPE: CPT | Performed by: STUDENT IN AN ORGANIZED HEALTH CARE EDUCATION/TRAINING PROGRAM

## 2024-01-12 PROCEDURE — 70450 CT HEAD/BRAIN W/O DYE: CPT | Performed by: RADIOLOGY

## 2024-01-12 PROCEDURE — 36415 COLL VENOUS BLD VENIPUNCTURE: CPT | Performed by: STUDENT IN AN ORGANIZED HEALTH CARE EDUCATION/TRAINING PROGRAM

## 2024-01-12 PROCEDURE — 2500000004 HC RX 250 GENERAL PHARMACY W/ HCPCS (ALT 636 FOR OP/ED)

## 2024-01-12 PROCEDURE — 94640 AIRWAY INHALATION TREATMENT: CPT | Mod: 59

## 2024-01-12 PROCEDURE — 99285 EMERGENCY DEPT VISIT HI MDM: CPT | Performed by: STUDENT IN AN ORGANIZED HEALTH CARE EDUCATION/TRAINING PROGRAM

## 2024-01-12 PROCEDURE — 93005 ELECTROCARDIOGRAM TRACING: CPT

## 2024-01-12 PROCEDURE — 84484 ASSAY OF TROPONIN QUANT: CPT | Performed by: STUDENT IN AN ORGANIZED HEALTH CARE EDUCATION/TRAINING PROGRAM

## 2024-01-12 PROCEDURE — 80053 COMPREHEN METABOLIC PANEL: CPT | Performed by: STUDENT IN AN ORGANIZED HEALTH CARE EDUCATION/TRAINING PROGRAM

## 2024-01-12 PROCEDURE — 71046 X-RAY EXAM CHEST 2 VIEWS: CPT

## 2024-01-12 PROCEDURE — 96375 TX/PRO/DX INJ NEW DRUG ADDON: CPT

## 2024-01-12 PROCEDURE — 83735 ASSAY OF MAGNESIUM: CPT | Performed by: STUDENT IN AN ORGANIZED HEALTH CARE EDUCATION/TRAINING PROGRAM

## 2024-01-12 PROCEDURE — 2500000002 HC RX 250 W HCPCS SELF ADMINISTERED DRUGS (ALT 637 FOR MEDICARE OP, ALT 636 FOR OP/ED): Performed by: STUDENT IN AN ORGANIZED HEALTH CARE EDUCATION/TRAINING PROGRAM

## 2024-01-12 PROCEDURE — 71046 X-RAY EXAM CHEST 2 VIEWS: CPT | Performed by: RADIOLOGY

## 2024-01-12 PROCEDURE — 87636 SARSCOV2 & INF A&B AMP PRB: CPT | Performed by: STUDENT IN AN ORGANIZED HEALTH CARE EDUCATION/TRAINING PROGRAM

## 2024-01-12 PROCEDURE — 96365 THER/PROPH/DIAG IV INF INIT: CPT

## 2024-01-12 PROCEDURE — 85025 COMPLETE CBC W/AUTO DIFF WBC: CPT | Performed by: STUDENT IN AN ORGANIZED HEALTH CARE EDUCATION/TRAINING PROGRAM

## 2024-01-12 PROCEDURE — 84100 ASSAY OF PHOSPHORUS: CPT | Performed by: STUDENT IN AN ORGANIZED HEALTH CARE EDUCATION/TRAINING PROGRAM

## 2024-01-12 RX ORDER — CALCIUM GLUCONATE 20 MG/ML
INJECTION, SOLUTION INTRAVENOUS
Status: COMPLETED
Start: 2024-01-12 | End: 2024-01-12

## 2024-01-12 RX ORDER — ALBUTEROL SULFATE 5 MG/ML
10 SOLUTION RESPIRATORY (INHALATION) ONCE
Status: DISCONTINUED | OUTPATIENT
Start: 2024-01-12 | End: 2024-01-13 | Stop reason: HOSPADM

## 2024-01-12 RX ORDER — CALCIUM GLUCONATE 20 MG/ML
2 INJECTION, SOLUTION INTRAVENOUS ONCE
Status: COMPLETED | OUTPATIENT
Start: 2024-01-12 | End: 2024-01-12

## 2024-01-12 RX ORDER — DEXTROSE 50 % IN WATER (D50W) INTRAVENOUS SYRINGE
25 ONCE
Status: COMPLETED | OUTPATIENT
Start: 2024-01-12 | End: 2024-01-12

## 2024-01-12 RX ORDER — INDOMETHACIN 25 MG/1
50 CAPSULE ORAL ONCE
Status: COMPLETED | OUTPATIENT
Start: 2024-01-12 | End: 2024-01-12

## 2024-01-12 RX ADMIN — DEXTROSE MONOHYDRATE 25 G: 25 INJECTION, SOLUTION INTRAVENOUS at 21:45

## 2024-01-12 RX ADMIN — SODIUM BICARBONATE 50 MEQ: 84 INJECTION, SOLUTION INTRAVENOUS at 21:45

## 2024-01-12 RX ADMIN — INSULIN HUMAN 5 UNITS: 100 INJECTION, SOLUTION PARENTERAL at 21:45

## 2024-01-12 RX ADMIN — CALCIUM GLUCONATE 2 G: 20 INJECTION, SOLUTION INTRAVENOUS at 21:30

## 2024-01-12 RX ADMIN — SODIUM ZIRCONIUM CYCLOSILICATE 15 G: 10 POWDER, FOR SUSPENSION ORAL at 23:15

## 2024-01-12 RX ADMIN — SODIUM CHLORIDE, POTASSIUM CHLORIDE, SODIUM LACTATE AND CALCIUM CHLORIDE 1000 ML: 600; 310; 30; 20 INJECTION, SOLUTION INTRAVENOUS at 21:45

## 2024-01-12 ASSESSMENT — LIFESTYLE VARIABLES
REASON UNABLE TO ASSESS: NO
EVER HAD A DRINK FIRST THING IN THE MORNING TO STEADY YOUR NERVES TO GET RID OF A HANGOVER: NO
HAVE YOU EVER FELT YOU SHOULD CUT DOWN ON YOUR DRINKING: NO
EVER FELT BAD OR GUILTY ABOUT YOUR DRINKING: NO
HAVE PEOPLE ANNOYED YOU BY CRITICIZING YOUR DRINKING: NO

## 2024-01-12 ASSESSMENT — PAIN SCALES - GENERAL: PAINLEVEL_OUTOF10: 0 - NO PAIN

## 2024-01-12 ASSESSMENT — PAIN - FUNCTIONAL ASSESSMENT: PAIN_FUNCTIONAL_ASSESSMENT: 0-10

## 2024-01-12 ASSESSMENT — COLUMBIA-SUICIDE SEVERITY RATING SCALE - C-SSRS
6. HAVE YOU EVER DONE ANYTHING, STARTED TO DO ANYTHING, OR PREPARED TO DO ANYTHING TO END YOUR LIFE?: NO
1. IN THE PAST MONTH, HAVE YOU WISHED YOU WERE DEAD OR WISHED YOU COULD GO TO SLEEP AND NOT WAKE UP?: NO
2. HAVE YOU ACTUALLY HAD ANY THOUGHTS OF KILLING YOURSELF?: NO

## 2024-01-12 ASSESSMENT — PAIN DESCRIPTION - PAIN TYPE: TYPE: ACUTE PAIN

## 2024-01-12 NOTE — ED TRIAGE NOTES
Pt. to ED via EMS. States she was diagnosed with a UTI last week and began feeling extremely weak today around 4 PM. Endorses no pain.

## 2024-01-12 NOTE — ED PROVIDER NOTES
"CC: Weakness, Gen     HPI:  Patient is a 89-year-old female with PMH of hypokalemic metabolic acidosis, HTN, CKD, Graves' disease, and urinary retention, presenting to the ED after syncopal event.  Son states that patient was in her normal state of health when she had a witnessed episode of \"falling out\" today prior to arrival.  States they were able to lower her to the ground did not hit her head.  Was not unconscious for more than a few seconds.  Denies any shaking or seizure-like activity.  Patient herself denies any symptoms including preceding chest pain, palpitations, lightheadedness or dizziness.  Son does mention that patient had diarrhea recently after starting an antibiotic.      Limitations to History: Dementia    Additional History Obtained from: Son    Records Reviewed: Recent available ED and inpatient notes reviewed in EMR.    PMHx/PSHx:  Per HPI.   - has a past medical history of Impacted cerumen, bilateral (07/13/2021), Impaired fasting glucose (05/20/2020), Nontoxic single thyroid nodule (07/06/2016), Nontoxic single thyroid nodule (07/06/2016), Other conditions influencing health status (11/25/2018), Personal history of other diseases of the female genital tract (11/10/2015), Personal history of other diseases of the nervous system and sense organs (07/11/2017), Personal history of other diseases of the respiratory system (10/23/2013), Personal history of other endocrine, nutritional and metabolic disease (10/25/2021), Personal history of other medical treatment (07/11/2016), Personal history of other specified conditions (03/09/2021), Personal history of other specified conditions (09/18/2013), Personal history of other specified conditions (01/28/2022), Personal history of other specified conditions (04/19/2021), Personal history of other specified conditions (01/28/2022), and Personal history of other specified conditions (07/13/2021).  - has a past surgical history that includes Other surgical " history (09/08/2020).    Medications: Reviewed in EMR. See EMR for complete list of medications and doses.    Allergies:  Bactrim [sulfamethoxazole-trimethoprim]    Social History:  - Tobacco:  has no history on file for tobacco use.   - Alcohol:  has no history on file for alcohol use.   - Illicit Drugs:  has no history on file for drug use.   ???????????????????????????????????????????????????????????????  Triage Vitals:  T 36.8 °C (98.3 °F)  HR 62  /73  RR 17  O2 95 % None (Room air)    PHYSICAL EXAM:   VS: As documented in the triage note and EMR flowsheet from this visit were reviewed.  Gen: Elderly female resting comfortably, not in acute distress  Eyes: EOMI. Clear scerla.  HENT: NC/AT, Mucosal membranes dry..   Neck: Supple, no cervical LAD  Resp: Non-labored breathing on RA, CTAB, no wheezes or crackles  CV: RRR, nl S1, S2, no murmurs  Abd: Soft, non-distended, non-tender, no rebound or guarding  Ext: no LE edema, pulses full and equal  Skin: WWP. No systemic rashes or lesions.  MSK:  no obvious joint swelling in extremities  Neuro:  AAOx3 to person, place, and time.  No facial droop. Facial movement and sensation intact. Uvula midline. Tongue protrudes midline, strong shoulder shrug and head turning against resistance.  5/5 strength at proximal and distal muscles in BUE and BLE. Sensation grossly intact to light touch in BUE and BLE.  Psych: Maintains eye contact, Appropriate mood and affect  ???????????????????????????????????????????????????????????????    ED Labs/Imaging:   Labs Reviewed   CBC WITH AUTO DIFFERENTIAL - Abnormal       Result Value    WBC 6.3      nRBC 0.0      RBC 4.79      Hemoglobin 13.4      Hematocrit 39.4      MCV 82      MCH 28.0      MCHC 34.0      RDW 15.9 (*)     Platelets 294      Neutrophils % 54.0      Immature Granulocytes %, Automated 0.3      Lymphocytes % 36.9      Monocytes % 8.3      Eosinophils % 0.2      Basophils % 0.3      Neutrophils Absolute 3.40       Immature Granulocytes Absolute, Automated 0.02      Lymphocytes Absolute 2.32      Monocytes Absolute 0.52      Eosinophils Absolute 0.01      Basophils Absolute 0.02     URINALYSIS WITH REFLEX CULTURE AND MICROSCOPIC - Abnormal    Color, Urine Yellow      Appearance, Urine Clear      Specific Gravity, Urine 1.013      pH, Urine 6.0      Protein, Urine 30 (1+) (*)     Glucose, Urine NEGATIVE      Blood, Urine NEGATIVE      Ketones, Urine NEGATIVE      Bilirubin, Urine NEGATIVE      Urobilinogen, Urine <2.0      Nitrite, Urine NEGATIVE      Leukocyte Esterase, Urine NEGATIVE     COMPREHENSIVE METABOLIC PANEL - Abnormal    Glucose 99      Sodium 134 (*)     Potassium 7.0 (*)     Chloride 110 (*)     Bicarbonate 13 (*)     Anion Gap 18      Urea Nitrogen 27 (*)     Creatinine 2.32 (*)     eGFR 19 (*)     Calcium 9.9      Albumin 4.4      Alkaline Phosphatase 90      Total Protein 7.9      AST 22      Bilirubin, Total 0.4      ALT 10     BLOOD GAS VENOUS FULL PANEL - Abnormal    POCT pH, Venous 7.34      POCT pCO2, Venous 31 (*)     POCT pO2, Venous 65 (*)     POCT SO2, Venous 94 (*)     POCT Oxy Hemoglobin, Venous 91.6 (*)     POCT Hematocrit Calculated, Venous 37.0      POCT Sodium, Venous 133 (*)     POCT Potassium, Venous 6.3 (*)     POCT Chloride, Venous 108 (*)     POCT Ionized Calicum, Venous 1.47 (*)     POCT Glucose, Venous 81      POCT Lactate, Venous 1.6      POCT Base Excess, Venous -8.0 (*)     POCT HCO3 Calculated, Venous 16.7 (*)     POCT Hemoglobin, Venous 12.3      POCT Anion Gap, Venous 15.0      Patient Temperature 37.0      FiO2 21     POTASSIUM - Abnormal    Potassium 6.7 (*)    RENAL FUNCTION PANEL - Abnormal    Glucose 84      Sodium 134 (*)     Potassium 5.5 (*)     Chloride 106      Bicarbonate 19 (*)     Anion Gap 15      Urea Nitrogen 22      Creatinine 1.97 (*)     eGFR 24 (*)     Calcium 10.2      Phosphorus 2.9      Albumin 4.2     RENAL FUNCTION PANEL - Abnormal    Glucose 76      Sodium  138      Potassium 4.4      Chloride 114 (*)     Bicarbonate 14 (*)     Anion Gap 14      Urea Nitrogen 18      Creatinine 1.53 (*)     eGFR 32 (*)     Calcium 7.8 (*)     Phosphorus 2.6      Albumin 3.1 (*)    URINALYSIS MICROSCOPIC WITH REFLEX CULTURE - Abnormal    WBC, Urine NONE      RBC, Urine 1-2      Hyaline Casts, Urine OCCASIONAL (*)    SARS-COV-2 PCR, SYMPTOMATIC - Normal    Coronavirus 2019, PCR Not Detected      Narrative:     This assay has received FDA Emergency Use Authorization (EUA) and is only authorized for the duration of time that circumstances exist to justify the authorization of the emergency use of in vitro diagnostic tests for the detection of SARS-CoV-2 virus and/or diagnosis of COVID-19 infection under section 564(b)(1) of the Act, 21 U.S.C. 360bbb-3(b)(1). This assay is an in vitro diagnostic nucleic acid amplification test for the qualitative detection of SARS-CoV-2 from nasopharyngeal specimens and has been validated for use at [facility]. Negative results do not preclude COVID-19 infections and should not be used as the sole basis for diagnosis, treatment, or other management decisions.     INFLUENZA A AND B PCR - Normal    Flu A Result Not Detected      Flu B Result Not Detected      Narrative:     This assay is an in vitro diagnostic multiplex nucleic acid amplification test for the detection and discrimination of Influenza A & B from nasopharyngeal specimens, and has been validated for use at [facility]. Negative results do not preclude Influenza A/B infections, and should not be used as the sole basis for diagnosis, treatment, or other management decisions. If Influenza A/B and RSV PCR results are negative, testing for Parainfluenza virus, Adenovirus and Metapneumovirus is routinely performed for [facility] pediatric oncology and intensive care inpatients, and is available on other patients by placing an add-on request.   MAGNESIUM - Normal     Magnesium 2.18     PHOSPHORUS - Normal    Phosphorus 3.3     TROPONIN I, HIGH SENSITIVITY - Normal    Troponin I, High Sensitivity 18      Narrative:     Less than 99th percentile of normal range cutoff-  Female and children under 18 years old <35 ng/L; Male <54 ng/L: Negative  Repeat testing should be performed if clinically indicated.     Female and children under 18 years old  ng/L; Male  ng/L:  Consistent with possible cardiac damage and possible increased clinical   risk. Serial measurements may help to assess extent of myocardial damage.     >120 ng/L: Consistent with cardiac damage, increased clinical risk and  myocardial infarction. Serial measurements may help assess extent of   myocardial damage.      NOTE: Children less than 1 year old may have higher baseline troponin   levels and results should be interpreted in conjunction with the overall   clinical context.    NOTE: Troponin I testing is performed using a different   testing methodology at [facility] than at other   [facility]. Direct result comparisons should only   be made within the same method.     SERIAL TROPONIN-INITIAL - Normal    Troponin I, High Sensitivity 20      Narrative:     Less than 99th percentile of normal range cutoff-  Female and children under 18 years old <35 ng/L; Male <54 ng/L: Negative  Repeat testing should be performed if clinically indicated.     Female and children under 18 years old  ng/L; Male  ng/L:  Consistent with possible cardiac damage and possible increased clinical   risk. Serial measurements may help to assess extent of myocardial damage.     >120 ng/L: Consistent with cardiac damage, increased clinical risk and  myocardial infarction. Serial measurements may help assess extent of   myocardial damage.      NOTE: Children less than 1 year old may have higher baseline troponin   levels and results should be interpreted in conjunction with the overall   clinical  context.    NOTE: Troponin I testing is performed using a different   testing methodology at [facility] than at other   [facility]. Direct result comparisons should only   be made within the same method.     SERIAL TROPONIN, 1 HOUR - Normal    Troponin I, High Sensitivity 18      Narrative:     Less than 99th percentile of normal range cutoff-  Female and children under 18 years old <35 ng/L; Male <54 ng/L: Negative  Repeat testing should be performed if clinically indicated.     Female and children under 18 years old  ng/L; Male  ng/L:  Consistent with possible cardiac damage and possible increased clinical   risk. Serial measurements may help to assess extent of myocardial damage.     >120 ng/L: Consistent with cardiac damage, increased clinical risk and  myocardial infarction. Serial measurements may help assess extent of   myocardial damage.      NOTE: Children less than 1 year old may have higher baseline troponin   levels and results should be interpreted in conjunction with the overall   clinical context.    NOTE: Troponin I testing is performed using a different   testing methodology at [facility] than at other   [facility]. Direct result comparisons should only   be made within the same method.     POCT GLUCOSE - Normal    POCT Glucose 98     TROPONIN SERIES- (INITIAL, 1 HR)    Narrative:     The following orders were created for panel order Troponin I Series, High Sensitivity (0, 1 HR).  Procedure                               Abnormality         Status                     ---------                               -----------         ------                     Troponin I, High Sensiti...[[id number]]  Normal              Final result               Troponin, High Sensitivi...[[id number]]  Normal              Final result                 Please view results for these tests on the individual orders.   URINE GRAY TUBE    Extra Tube Hold for add-ons.     URINALYSIS  WITH REFLEX CULTURE AND MICROSCOPIC    Narrative:     The following orders were created for panel order Urinalysis with Reflex Culture and Microscopic.  Procedure                               Abnormality         Status                     ---------                               -----------         ------                     Urinalysis with Reflex C...[[id number]]  Abnormal            Final result               Extra Urine Gray Tube[[id number]]                                                         Please view results for these tests on the individual orders.   EXTRA URINE GRAY TUBE   URINALYSIS WITH REFLEX CULTURE AND MICROSCOPIC    Narrative:     The following orders were created for panel order Urinalysis with Reflex Culture and Microscopic.  Procedure                               Abnormality         Status                     ---------                               -----------         ------                     Urinalysis with Reflex C...[[id number]]                                                 Extra Urine Gray Tube[[id number]]                                                         Please view results for these tests on the individual orders.   URINALYSIS WITH REFLEX CULTURE AND MICROSCOPIC   EXTRA URINE GRAY TUBE   POCT GLUCOSE METER     CT head wo IV contrast   Final Result   1.  No acute intracranial hemorrhage or acute territorial infarct.                  I personally reviewed the images/study and I agree with the findings   as stated.        MACRO:   None.        Signed by: Raysa Webster 1/12/2024 11:01 PM   Dictation workstation:   [id number]      XR chest 2 views   Final Result   No evidence of acute intrathoracic abnormality.        Signed by: Marvel Gasca 1/12/2024 5:25 PM   Dictation workstation:   [id number]            ED Course & [facility]   ED Course as of 01/13/24 1628   Fri Jan 12, 2024   1545 I independently interpreted the EKG:  Regular rate. Regular rhythm. Normal axis.   Normal UT, QRS, and  Qtc intervals.   No ST segment elevations, depressions. T wave inversions in leads aVL, V2, V4-V6 unchanged from previous.   Impression: NSR, no STEMI. [KR]   2016 Positive orthostats [KR]   Sat Jan 13, 2024   0025 Flu B Result: Not Detected [SEVEN]   0238 POTASSIUM(!!): 6.7  Potassium remains elevated.  Provided with Lasix.  Will recheck RFP. [JV]      ED Course User Index  [SEVEN] Cynthia Blanca MD  [JV] Jh Singleton MD  [KR] Hue Montenegro MD         Diagnoses as of 01/13/24 1628   Hyperkalemia   Diarrhea, unspecified type   Acute renal failure, unspecified acute renal failure type (CMS/HCC)           Medical Decision Making:  This is a 89-year-old female with above-stated history presenting to the ED for generalized weakness and syncopal episode.  Patient arrives hemodynamically stable and not in acute distress.  Patient does appear dehydrated with dry membranes.  Neuroexam intact.  Orthostatics at bedside grossly positive patient dropping to 70s over 50s upon standing.  Do believe that patient had an orthostatic syncopal episode but given age will require admission for further cardiac workup.  EKG was obtained and shows no signs of acute ischemia or arrhythmia.  Patient's labs notable for hyperkalemia at 7.0 and JOSÉ MIGUEL, with a compensated metabolic acidosis.  Repeat EKG shows no signs of hyperkalemic changes.  Patient moved into a monitored room and administered hyperkalemia cocktail and nephrology consult placed.  Do believe patient's JOSÉ MIGUEL and electrolyte abnormality secondary to dehydration IV fluid resuscitation initiated.  Patient handed off to oncoming provider pending repeat potassium and urinalysis as well as bladder scan.  Patient's son updated on the plan was agreeable for admission.  All questions were answered.      Social Determinants Limiting Care:  None identified    Disposition:  Patient was signed out at 2300 pending completion of their work-up.  Please see the next provider's transition  of care note for the remainder of the patient's care.    Patient seen and discussed with attending physician.    Hue Montenegro MD PGY3  Emergency Medicine      Procedures ? SmartLinks last updated 1/13/2024 4:28 PM          Hue Montenegro MD  Resident  01/13/24 1532

## 2024-01-13 ENCOUNTER — CLINICAL SUPPORT (OUTPATIENT)
Dept: EMERGENCY MEDICINE | Facility: HOSPITAL | Age: 89
End: 2024-01-13
Payer: MEDICARE

## 2024-01-13 VITALS
SYSTOLIC BLOOD PRESSURE: 127 MMHG | DIASTOLIC BLOOD PRESSURE: 90 MMHG | TEMPERATURE: 97.9 F | WEIGHT: 110 LBS | BODY MASS INDEX: 18.78 KG/M2 | HEART RATE: 77 BPM | OXYGEN SATURATION: 98 % | HEIGHT: 64 IN | RESPIRATION RATE: 17 BRPM

## 2024-01-13 LAB
ALBUMIN SERPL BCP-MCNC: 3.1 G/DL (ref 3.4–5)
ALBUMIN SERPL BCP-MCNC: 4.2 G/DL (ref 3.4–5)
ANION GAP SERPL CALC-SCNC: 14 MMOL/L (ref 10–20)
ANION GAP SERPL CALC-SCNC: 15 MMOL/L (ref 10–20)
APPEARANCE UR: CLEAR
ATRIAL RATE: 60 BPM
BILIRUB UR STRIP.AUTO-MCNC: NEGATIVE MG/DL
BUN SERPL-MCNC: 18 MG/DL (ref 6–23)
BUN SERPL-MCNC: 22 MG/DL (ref 6–23)
CALCIUM SERPL-MCNC: 10.2 MG/DL (ref 8.6–10.6)
CALCIUM SERPL-MCNC: 7.8 MG/DL (ref 8.6–10.6)
CARDIAC TROPONIN I PNL SERPL HS: 18 NG/L (ref 0–34)
CHLORIDE SERPL-SCNC: 106 MMOL/L (ref 98–107)
CHLORIDE SERPL-SCNC: 114 MMOL/L (ref 98–107)
CO2 SERPL-SCNC: 14 MMOL/L (ref 21–32)
CO2 SERPL-SCNC: 19 MMOL/L (ref 21–32)
COLOR UR: YELLOW
CREAT SERPL-MCNC: 1.53 MG/DL (ref 0.5–1.05)
CREAT SERPL-MCNC: 1.97 MG/DL (ref 0.5–1.05)
EGFRCR SERPLBLD CKD-EPI 2021: 24 ML/MIN/1.73M*2
EGFRCR SERPLBLD CKD-EPI 2021: 32 ML/MIN/1.73M*2
GLUCOSE BLD MANUAL STRIP-MCNC: 98 MG/DL (ref 74–99)
GLUCOSE SERPL-MCNC: 76 MG/DL (ref 74–99)
GLUCOSE SERPL-MCNC: 84 MG/DL (ref 74–99)
GLUCOSE UR STRIP.AUTO-MCNC: NEGATIVE MG/DL
HOLD SPECIMEN: NORMAL
HYALINE CASTS #/AREA URNS AUTO: ABNORMAL /LPF
KETONES UR STRIP.AUTO-MCNC: NEGATIVE MG/DL
LEUKOCYTE ESTERASE UR QL STRIP.AUTO: NEGATIVE
NITRITE UR QL STRIP.AUTO: NEGATIVE
P AXIS: 56 DEGREES
P OFFSET: 191 MS
P ONSET: 158 MS
PH UR STRIP.AUTO: 6 [PH]
PHOSPHATE SERPL-MCNC: 2.6 MG/DL (ref 2.5–4.9)
PHOSPHATE SERPL-MCNC: 2.9 MG/DL (ref 2.5–4.9)
POTASSIUM SERPL-SCNC: 4.4 MMOL/L (ref 3.5–5.3)
POTASSIUM SERPL-SCNC: 5.5 MMOL/L (ref 3.5–5.3)
PR INTERVAL: 144 MS
PROT UR STRIP.AUTO-MCNC: ABNORMAL MG/DL
Q ONSET: 230 MS
QRS COUNT: 10 BEATS
QRS DURATION: 74 MS
QT INTERVAL: 388 MS
QTC CALCULATION(BAZETT): 388 MS
QTC FREDERICIA: 388 MS
R AXIS: 15 DEGREES
RBC # UR STRIP.AUTO: NEGATIVE /UL
RBC #/AREA URNS AUTO: ABNORMAL /HPF
SODIUM SERPL-SCNC: 134 MMOL/L (ref 136–145)
SODIUM SERPL-SCNC: 138 MMOL/L (ref 136–145)
SP GR UR STRIP.AUTO: 1.01
T AXIS: 134 DEGREES
T OFFSET: 424 MS
UROBILINOGEN UR STRIP.AUTO-MCNC: <2 MG/DL
VENTRICULAR RATE: 60 BPM
WBC #/AREA URNS AUTO: ABNORMAL /HPF

## 2024-01-13 PROCEDURE — 36415 COLL VENOUS BLD VENIPUNCTURE: CPT | Performed by: STUDENT IN AN ORGANIZED HEALTH CARE EDUCATION/TRAINING PROGRAM

## 2024-01-13 PROCEDURE — G0378 HOSPITAL OBSERVATION PER HR: HCPCS

## 2024-01-13 PROCEDURE — 2500000004 HC RX 250 GENERAL PHARMACY W/ HCPCS (ALT 636 FOR OP/ED): Performed by: STUDENT IN AN ORGANIZED HEALTH CARE EDUCATION/TRAINING PROGRAM

## 2024-01-13 PROCEDURE — 84484 ASSAY OF TROPONIN QUANT: CPT | Performed by: STUDENT IN AN ORGANIZED HEALTH CARE EDUCATION/TRAINING PROGRAM

## 2024-01-13 PROCEDURE — 99222 1ST HOSP IP/OBS MODERATE 55: CPT | Performed by: NURSE PRACTITIONER

## 2024-01-13 PROCEDURE — 2500000001 HC RX 250 WO HCPCS SELF ADMINISTERED DRUGS (ALT 637 FOR MEDICARE OP): Performed by: NURSE PRACTITIONER

## 2024-01-13 PROCEDURE — 93005 ELECTROCARDIOGRAM TRACING: CPT

## 2024-01-13 PROCEDURE — 80069 RENAL FUNCTION PANEL: CPT | Performed by: STUDENT IN AN ORGANIZED HEALTH CARE EDUCATION/TRAINING PROGRAM

## 2024-01-13 PROCEDURE — 96361 HYDRATE IV INFUSION ADD-ON: CPT

## 2024-01-13 PROCEDURE — 96375 TX/PRO/DX INJ NEW DRUG ADDON: CPT

## 2024-01-13 PROCEDURE — 2500000004 HC RX 250 GENERAL PHARMACY W/ HCPCS (ALT 636 FOR OP/ED): Performed by: NURSE PRACTITIONER

## 2024-01-13 PROCEDURE — 80069 RENAL FUNCTION PANEL: CPT | Mod: MUE | Performed by: STUDENT IN AN ORGANIZED HEALTH CARE EDUCATION/TRAINING PROGRAM

## 2024-01-13 RX ORDER — METHIMAZOLE 5 MG/1
5 TABLET ORAL DAILY
Status: DISCONTINUED | OUTPATIENT
Start: 2024-01-13 | End: 2024-01-13 | Stop reason: HOSPADM

## 2024-01-13 RX ORDER — ACETAMINOPHEN 325 MG/1
650 TABLET ORAL EVERY 4 HOURS PRN
Status: DISCONTINUED | OUTPATIENT
Start: 2024-01-13 | End: 2024-01-13 | Stop reason: HOSPADM

## 2024-01-13 RX ORDER — ASPIRIN 81 MG/1
81 TABLET ORAL DAILY
Status: DISCONTINUED | OUTPATIENT
Start: 2024-01-13 | End: 2024-01-13 | Stop reason: HOSPADM

## 2024-01-13 RX ORDER — ACETAMINOPHEN 160 MG/5ML
650 SOLUTION ORAL EVERY 4 HOURS PRN
Status: DISCONTINUED | OUTPATIENT
Start: 2024-01-13 | End: 2024-01-13 | Stop reason: HOSPADM

## 2024-01-13 RX ORDER — SULFAMETHOXAZOLE AND TRIMETHOPRIM 800; 160 MG/1; MG/1
1 TABLET ORAL 2 TIMES DAILY
Status: DISCONTINUED | OUTPATIENT
Start: 2024-01-13 | End: 2024-01-13

## 2024-01-13 RX ORDER — ACETAMINOPHEN 500 MG
10 TABLET ORAL DAILY PRN
Status: DISCONTINUED | OUTPATIENT
Start: 2024-01-13 | End: 2024-01-13 | Stop reason: HOSPADM

## 2024-01-13 RX ORDER — POLYETHYLENE GLYCOL 3350 17 G/17G
17 POWDER, FOR SOLUTION ORAL DAILY PRN
Status: DISCONTINUED | OUTPATIENT
Start: 2024-01-13 | End: 2024-01-13 | Stop reason: HOSPADM

## 2024-01-13 RX ORDER — ACETAMINOPHEN 650 MG/1
650 SUPPOSITORY RECTAL EVERY 4 HOURS PRN
Status: DISCONTINUED | OUTPATIENT
Start: 2024-01-13 | End: 2024-01-13 | Stop reason: HOSPADM

## 2024-01-13 RX ORDER — LEVOTHYROXINE SODIUM 50 UG/1
50 TABLET ORAL DAILY
Status: DISCONTINUED | OUTPATIENT
Start: 2024-01-13 | End: 2024-01-13 | Stop reason: HOSPADM

## 2024-01-13 RX ORDER — FUROSEMIDE 10 MG/ML
20 INJECTION INTRAMUSCULAR; INTRAVENOUS ONCE
Status: COMPLETED | OUTPATIENT
Start: 2024-01-13 | End: 2024-01-13

## 2024-01-13 RX ORDER — SODIUM BICARBONATE 650 MG/1
650 TABLET ORAL EVERY 12 HOURS
Status: DISCONTINUED | OUTPATIENT
Start: 2024-01-13 | End: 2024-01-13 | Stop reason: HOSPADM

## 2024-01-13 RX ADMIN — ASPIRIN 81 MG: 81 TABLET, COATED ORAL at 08:04

## 2024-01-13 RX ADMIN — SODIUM BICARBONATE 650 MG: 650 TABLET ORAL at 08:03

## 2024-01-13 RX ADMIN — SODIUM CHLORIDE 1000 ML: 0.9 INJECTION, SOLUTION INTRAVENOUS at 00:35

## 2024-01-13 RX ADMIN — METHIMAZOLE 5 MG: 5 TABLET ORAL at 08:03

## 2024-01-13 RX ADMIN — LEVOTHYROXINE SODIUM 50 MCG: 50 TABLET ORAL at 08:04

## 2024-01-13 RX ADMIN — FUROSEMIDE 20 MG: 10 INJECTION, SOLUTION INTRAMUSCULAR; INTRAVENOUS at 00:35

## 2024-01-13 ASSESSMENT — ENCOUNTER SYMPTOMS
SHORTNESS OF BREATH: 0
VOMITING: 0
CHILLS: 0
HEMATURIA: 0
NAUSEA: 0
ABDOMINAL PAIN: 0
CONSTIPATION: 0
FEVER: 0
WEAKNESS: 1
FREQUENCY: 0
PALPITATIONS: 0
DIARRHEA: 0
DYSURIA: 0
FLANK PAIN: 0

## 2024-01-13 ASSESSMENT — PAIN SCALES - GENERAL
PAINLEVEL_OUTOF10: 0 - NO PAIN
PAINLEVEL_OUTOF10: 0 - NO PAIN

## 2024-01-13 NOTE — PROGRESS NOTES
Pharmacy Medication History Review    Jamila Eric is a 89 y.o. female admitted for No Principal Problem currently listed. Pharmacy reviewed the patient's [id number] medications and allergies for accuracy.    The list below reflects the updated PTA list. Comments regarding how patient may be taking medications differently can be found in the Admit Orders Activity  Prior to Admission Medications   Prescriptions Last Dose Informant Patient Reported? Taking?   Therems-M 9 mg iron-400 mcg tablet  Self, Child Yes Yes   Sig: TAKE 1 TABLET BY MOUTH EVERY DAY   amLODIPine (Norvasc) 5 mg tablet  Self, Child Yes Yes   Sig: TAKE 1 & 1/2 TABLET BY MOUTH EVERY DAY   aspirin 81 mg EC tablet  Self, Child Yes Yes   Sig: Take 1 tablet (81 mg) by mouth once daily.   levothyroxine (Synthroid, Levoxyl) 25 mcg tablet  Self, Child Yes Yes   Sig: Take 2 tablets (50 mcg) by mouth once daily.   methIMAzole (Tapazole) 5 mg tablet  Self, Child Yes Yes   Sig: Take 1 tablet (5 mg) by mouth once daily.   sodium bicarbonate 650 mg tablet Patient Unsure Self, Child Yes Unsure   Sig: TAKE 1 TABLET BY MOUTH EVERY 12 HOURS   **Last Filled 7/19/23 for 90 Day Supply   sulfamethoxazole-trimethoprim (Bactrim DS) 800-160 mg tablet Not Taking Self, Child Yes No   Sig: Take 1 tablet by mouth 2 times a day for 14 days.   **Last Filled 1/3/2024 for a 14 Day Supply      Facility-Administered Medications: None        The list below reflects the updated allergy list. Please review each documented allergy for additional clarification and justification.  Allergies  Reviewed by Pawel Wynn CPhT on 1/12/2024   No Known Allergies         Patient accepts M2B at discharge. Pharmacy has been updated to [facility].    Sources used to complete the med history include:  Patient interviewed with son at bedside about medications taking  [facility] fill history reviewed  [facility] Ambulatory medication list reviewed  Care Everywhere, [facility] medication list  reviewed  1/3/24  ED Discharge Summary medication list reviewed    Below are additional concerns with the patient's PTA list.  **Patient had a history of taking 1 Sodium Bicarbonate 650 mg tablet every 12 hours but is unsure if she is currently still using this medication    ---------------------------------  Pawel Wynn CPhT  Transitions of Care Technician  Medication reconciliation complete  Please reach out via [employer] Secure Chat for questions,   or if no response call [employer] or [employer].  [facility]

## 2024-01-13 NOTE — PROGRESS NOTES
Patient was handed off to me by Dr. Montenegro at 2300. For full history, physical, and prior ED course, please see previous provider note prior to patient handoff. This is an addendum to the record.     HOSPITAL COURSE/MEDICAL DECISION MAKING  In short, this is a 89-year-old female presenting to the emergency department for weakness.  Was found to be hyperkalemic with an JOSÉ MIGUEL.  Was recently seen in the ED for UTI and was sent home with antibiotics.  It does appear that this may have caused some diarrhea leading to dehydration and sepsis and JOSÉ MIGUEL.  Received hyperkalemic cocktail, including Lasix with appropriate urine output.  Potassium did come down from 7-5.  No EKG changes.  Will be admitted to medicine for management of JOSÉ MIGUEL as well as hyperkalemia. Throughout the ED stay, the patient was monitored and re-examined for any changes in stability or symptomatology.     ED Course as of 01/13/24 0431 Fri Jan 12, 2024   1545 I independently interpreted the EKG:  Regular rate. Regular rhythm. Normal axis.   Normal IN, QRS, and Qtc intervals.   No ST segment elevations, depressions. T wave inversions in leads aVL, V2, V4-V6 unchanged from previous.   Impression: NSR, no STEMI. [KR]   2016 Positive orthostats [KR]   Sat Jan 13, 2024   0025 Flu B Result: Not Detected [SEVEN]   0238 POTASSIUM(!!): 6.7  Potassium remains elevated.  Provided with Lasix.  Will recheck RFP. [JV]      ED Course User Index  [SEVEN] Cynthia Blanca MD  [JV] Jh Singleton MD  [KR] Hue Montenegro MD         Diagnoses as of 01/13/24 0431   Hyperkalemia   Diarrhea, unspecified type   Acute renal failure, unspecified acute renal failure type (CMS/[facility])       DIAGNOSIS  1.  Hyperkalemia  2.  JOSÉ MIGUEL    DISPOSITION  Admit to medicine with telemetry     I reviewed the patient´s case with Dr. Blanca who also saw the patient and agrees with the plan. The diagnosis and plan of care was also discussed with the patient. All the patient's questions were  answered. The patient was receptive and agreeable to the plan of care.     Jh Singleton MD  Emergency Medicine PGY-3    This note was dictated using dragon dictation.  Please excuse any errors found in the note.

## 2024-01-13 NOTE — DISCHARGE INSTRUCTIONS
Physician discharge instructions:    Dear Ms. Hernesto,     Thank you for allowing me to be a part of the team that provided your medical care during this hospital stay.  Here is a brief summary of what we found and instructions for you as you leave the hospital.    Reason(s) for hospital stay:  Hyperkalemia (high potassium levels)  Acute kidney injury  -These were likely caused by the Bactrim antibiotic you were taking for your bladder infection; I recommend you stop taking this altogether and avoid this in the future. Please also avoid high potassium foods (including potatoes, tomatoes, and bananas) for the next week. Please drink plenty of fluids, preferably water, for the next week, then return to your normal diet with fluid restriction of 1.5L per day.   -Please follow up with your primary care physician (PCP) within the next 14 days.    Discharge instructions:  - Please attend all follow-up appointments as scheduled.  - Please bring a medication list and all of your medications to your appointments.  - Please take all medications exactly as prescribed in these instructions.    If your symptoms return or worsen, please seek immediate medical attention.      Thank you for allowing me to be part of your care. I hope you continue to feel better soon.    Lindsey Gutierrez, DO  [facility]   Hospital Medicine    Please note: You were cared for by a hospitalist during your hospital stay. Once you are discharged, your primary care physician will handle any further medical issues. Please note that no refills for any discharge medications will be authorized once you are discharged, as it is imperative that you return to your primary care physician (or establish a relationship with a primary care physician if you do not have one) for your aftercare needs so that they can reassess your need for medications and monitor your lab values. Your primary care physician should be able to send refills for  medications after your follow up appointment.

## 2024-01-13 NOTE — H&P
History Of Present Illness  Jamila Eric is a 89 y.o. female with a past medical history of hypokalemic metabolic acidosis, HTN, CKD, Graves' disease, and history of urinary retention who presented to the ED with generalized weakness. Notably, the patient was seen and evaluated on 01/03/2024 for a UTI and was prescribed Bactrim. On exam in ED18, she reports feeling overall well, and is forgetful, however does appear to be a reliable historian. She denies any fever, chills, chest pain, palpitation, shortness of breath, nausea, vomiting, diarrhea, or urinary symptoms.      Past Medical History  Past Medical History:   Diagnosis Date    Impacted cerumen, bilateral 07/13/2021    Bilateral impacted cerumen    Impaired fasting glucose 05/20/2020    Fasting hyperglycemia    Nontoxic single thyroid nodule 07/06/2016    Left thyroid nodule    Nontoxic single thyroid nodule 07/06/2016    Right thyroid nodule    Other conditions influencing health status 11/25/2018    History of cough    Personal history of other diseases of the female genital tract 11/10/2015    History of breast pain    Personal history of other diseases of the nervous system and sense organs 07/11/2017    History of redness of eye    Personal history of other diseases of the respiratory system 10/23/2013    History of acute bronchitis    Personal history of other endocrine, nutritional and metabolic disease 10/25/2021    History of metabolic acidosis    Personal history of other medical treatment 07/11/2016    History of screening mammography    Personal history of other specified conditions 03/09/2021    History of memory loss    Personal history of other specified conditions 09/18/2013    History of postnasal drip    Personal history of other specified conditions 01/28/2022    History of urinary frequency    Personal history of other specified conditions 04/19/2021    History of dizziness    Personal history of other specified conditions 01/28/2022     "History of urinary retention    Personal history of other specified conditions 07/13/2021    History of balance disorder       Surgical History  Past Surgical History:   Procedure Laterality Date    OTHER SURGICAL HISTORY  09/08/2020    No history of surgery        Social History  She has no history on file for tobacco use, alcohol use, and drug use.    Family History  Family History   Problem Relation Name Age of Onset    Hypertension Sister          Allergies  Patient has no known allergies.    Review of Systems   Constitutional:  Negative for chills and fever.   Respiratory:  Negative for shortness of breath.    Cardiovascular:  Negative for chest pain and palpitations.   Gastrointestinal:  Negative for abdominal pain, constipation, diarrhea, nausea and vomiting.   Genitourinary:  Negative for dysuria, flank pain, frequency, hematuria and urgency.   Neurological:  Positive for weakness.   All other systems reviewed and are negative.       Physical Exam  Vitals reviewed.   HENT:      Head: Normocephalic and atraumatic.   Eyes:      Comments: Right eye clouded   Cardiovascular:      Rate and Rhythm: Normal rate and regular rhythm.      Heart sounds: Normal heart sounds.   Pulmonary:      Effort: Pulmonary effort is normal.      Breath sounds: Normal air entry.   Abdominal:      General: Bowel sounds are normal.      Palpations: Abdomen is soft.      Tenderness: There is no abdominal tenderness.   Musculoskeletal:         General: No deformity.   Skin:     General: Skin is warm and dry.   Neurological:      General: No focal deficit present.      Mental Status: She is alert and oriented to person, place, and time.   Psychiatric:         Mood and Affect: Mood normal.         Behavior: Behavior normal.          Last Recorded Vitals  Blood pressure 150/71, pulse 83, temperature 36.8 °C (98.3 °F), temperature source Oral, resp. rate 18, height 1.626 m (5' 4\"), weight 49.9 kg (110 lb), SpO2 93 %.    Relevant " Results      Lab Results   Component Value Date    WBC 6.3 01/12/2024    HGB 13.4 01/12/2024    HCT 39.4 01/12/2024    MCV 82 01/12/2024     01/12/2024     Lab Results   Component Value Date    GLUCOSE 84 01/13/2024    CALCIUM 10.2 01/13/2024     (L) 01/13/2024    K 5.5 (H) 01/13/2024    CO2 19 (L) 01/13/2024     01/13/2024    BUN 22 01/13/2024    CREATININE 1.97 (H) 01/13/2024     ED Medication Administration from 01/12/2024 1450 to 01/13/2024 0506         Date/Time Order Dose Route Action Action by     01/12/2024 2130 EST albuterol nebulizer solution 10 mg 10 mg nebulization Not Given [facility],      01/12/2024 2130 EST calcium gluconate in NS IV 2 g 2 g intravenous New Bag [facility], S     01/12/2024 2145 EST dextrose 50 % injection 25 g 25 g intravenous Given [facility], S     01/12/2024 2145 EST insulin regular (HumuLIN R) injection 5 Units 5 Units intravenous Given [facility]     01/12/2024 2145 EST lactated Ringer's bolus 1,000 mL 1,000 mL intravenous New Bag [facility], S     01/12/2024 2145 EST sodium bicarbonate 1 mEq/mL (8.4 %) injection 50 mEq 50 mEq intravenous Given [facility]     01/12/2024 2230 EST calcium gluconate in NS IV 2 g 0 g intravenous Stopped Rub,      01/12/2024 2245 EST lactated Ringer's bolus 1,000 mL 0 mL intravenous Stopped Rubel,      01/12/2024 2315 EST sodium zirconium cyclosilicate (Lokelma) packet 15 g 15 g oral Given Rubel, J     01/13/2024 0035 EST furosemide (Lasix) injection 20 mg 20 mg intravenous Given Rub, J     01/13/2024 0035 EST sodium chloride 0.9 % bolus 1,000 mL 1,000 mL intravenous New Bag Rubel, J     01/13/2024 0135 EST sodium chloride 0.9 % bolus 1,000 mL 0 mL intravenous Stopped Rubel, J     01/13/2024 0450 EST sodium bicarbonate tablet 650 mg 650 mg oral Not Given NORM Parker               Assessment/Plan   Principal Problem:    Hyperkalemia      #Hyperkalemia  -Highly likely ISO Bactrim and impaired renal function  -Stop Bactrim  -Given K+ cocktail in  ED  -Telemetry  -Follow K+    #Impaired renal function  -Unclear if JOSÉ MIGUEL or CKD, highly variable creatinine  -Give IVF in ED, creatinine improving  -Avoid nephrotoxic agents  -Renal dosing where indicated  -Continue home HCO3  -Bladder scan  -FENa workup if no improvement    #UTI  -Diagnosed 01/03/2024  -Stop Bactrim (course completed)  -Repeat UA  -No urinary symptoms    #HTN  -Controlled  -Continue home medications    #Graves' disease  -No acute issues  -Continue methimazole         Antwon Sepulveda, APRN-CNP

## 2024-01-13 NOTE — DISCHARGE SUMMARY
[facility] Discharge Summary       NAME: Jamila Eric  : 1932  MRN:  [id number]    ADMIT DATE: 2024  DISCHARGE DATE:  2024    LENGTH OF STAY: 0    PRIMARYCARE PHYSICIAN:  Emiliana Hayden MD    VISIT STATUS: Observation    CODE STATUS:  Full Code    HOSPITAL COURSE, DISCHARGE DIAGNOSES, AND PLAN:  Jamila Eric is a 89 y.o. female with a past medical history of hypokalemic metabolic acidosis, HTN, CKD, Graves' disease, and history of urinary retention who presented to the ED with generalized weakness. Notably, the patient was seen and evaluated on 2024 for a UTI and was prescribed Bactrim.     #Hyperkalemia - resolved  -Highly likely ISO Bactrim and impaired renal function  -Stop Bactrim and avoid this altogether in the future  -Improved w K+ cocktail and IV fluids  -K back to normal prior to dc     #JOSÉ MIGUEL on CKD stage 3b  #Chronic metabolic acidosis  #Hx of SIADH  -Cr improved with IVF and home bicarb; back to baseline 1.5 prior to discharge  -Recommend ongoing hydration at home, but mindful of previously recommended fluid restriction of 1.5L daily     #UTI subacute, treated  -Diagnosed 2024  -Stop Bactrim altogether as above (course completed)  -Repeat UA without signs of infection  -No urinary symptoms     #HTN  -Controlled  -Continue home medications     #Graves' disease  -No acute issues  -Continue methimazole    PROCEDURES:  N/A    CONSULTANTS:  N/A    DISCHARGE MEDICATIONS:         Your medication list        CONTINUE taking these medications        Instructions Last Dose Given Next Dose Due   amLODIPine 5 mg tablet  Commonly known as: Norvasc      TAKE 1 & 1/2 TABLET BY MOUTH EVERY DAY       aspirin 81 mg EC tablet           levothyroxine 25 mcg tablet  Commonly known as: Synthroid, Levoxyl           methIMAzole 5 mg tablet  Commonly known as: Tapazole           sodium bicarbonate 650 mg tablet      TAKE 1 TABLET BY MOUTH EVERY 12 HOURS       Therems-M tablet  Generic drug:  multivitamin with minerals      TAKE 1 TABLET BY MOUTH EVERY DAY                 DIET:  low potassium for the next week, then can return to regular diet with fluid restriction of 1.5L as prior    ACTIVITY: resume regular activity    DISPOSITION:  Home    Follow up with Emiliana Hayden MD within 14 days of discharge.    DISCHARGE TIME: > 30 minutes    Electronically signed by Lindsey Gutierrez DO on 01/13/24 at 1:27 PM

## 2024-02-01 ENCOUNTER — OFFICE VISIT (OUTPATIENT)
Dept: ENDOCRINOLOGY | Facility: HOSPITAL | Age: 89
End: 2024-02-01
Payer: MEDICARE

## 2024-02-01 VITALS
BODY MASS INDEX: 22.38 KG/M2 | WEIGHT: 111 LBS | SYSTOLIC BLOOD PRESSURE: 148 MMHG | TEMPERATURE: 96.1 F | DIASTOLIC BLOOD PRESSURE: 60 MMHG | OXYGEN SATURATION: 98 % | HEIGHT: 59 IN | HEART RATE: 59 BPM

## 2024-02-01 DIAGNOSIS — E05.20 TOXIC MULTINODULAR GOITER: ICD-10-CM

## 2024-02-01 DIAGNOSIS — E05.90 HYPERTHYROIDISM: Primary | ICD-10-CM

## 2024-02-01 DIAGNOSIS — E03.2 HYPOTHYROIDISM DUE TO MEDICATION: ICD-10-CM

## 2024-02-01 LAB
T3 SERPL-MCNC: 115 NG/DL (ref 60–200)
T4 FREE SERPL-MCNC: 1.14 NG/DL (ref 0.78–1.48)
TSH SERPL-ACNC: 0.01 MIU/L (ref 0.44–3.98)

## 2024-02-01 PROCEDURE — 1159F MED LIST DOCD IN RCRD: CPT | Performed by: STUDENT IN AN ORGANIZED HEALTH CARE EDUCATION/TRAINING PROGRAM

## 2024-02-01 PROCEDURE — 84480 ASSAY TRIIODOTHYRONINE (T3): CPT | Performed by: STUDENT IN AN ORGANIZED HEALTH CARE EDUCATION/TRAINING PROGRAM

## 2024-02-01 PROCEDURE — 3078F DIAST BP <80 MM HG: CPT | Performed by: STUDENT IN AN ORGANIZED HEALTH CARE EDUCATION/TRAINING PROGRAM

## 2024-02-01 PROCEDURE — 3077F SYST BP >= 140 MM HG: CPT | Performed by: STUDENT IN AN ORGANIZED HEALTH CARE EDUCATION/TRAINING PROGRAM

## 2024-02-01 PROCEDURE — 1036F TOBACCO NON-USER: CPT | Performed by: STUDENT IN AN ORGANIZED HEALTH CARE EDUCATION/TRAINING PROGRAM

## 2024-02-01 PROCEDURE — 84443 ASSAY THYROID STIM HORMONE: CPT | Performed by: STUDENT IN AN ORGANIZED HEALTH CARE EDUCATION/TRAINING PROGRAM

## 2024-02-01 PROCEDURE — 84439 ASSAY OF FREE THYROXINE: CPT | Performed by: STUDENT IN AN ORGANIZED HEALTH CARE EDUCATION/TRAINING PROGRAM

## 2024-02-01 PROCEDURE — 1126F AMNT PAIN NOTED NONE PRSNT: CPT | Performed by: STUDENT IN AN ORGANIZED HEALTH CARE EDUCATION/TRAINING PROGRAM

## 2024-02-01 PROCEDURE — 36415 COLL VENOUS BLD VENIPUNCTURE: CPT | Performed by: STUDENT IN AN ORGANIZED HEALTH CARE EDUCATION/TRAINING PROGRAM

## 2024-02-01 PROCEDURE — 99215 OFFICE O/P EST HI 40 MIN: CPT | Performed by: STUDENT IN AN ORGANIZED HEALTH CARE EDUCATION/TRAINING PROGRAM

## 2024-02-01 PROCEDURE — 84445 ASSAY OF TSI GLOBULIN: CPT | Performed by: STUDENT IN AN ORGANIZED HEALTH CARE EDUCATION/TRAINING PROGRAM

## 2024-02-01 RX ORDER — METHIMAZOLE 5 MG/1
5 TABLET ORAL DAILY
Qty: 90 TABLET | Refills: 3 | Status: SHIPPED | OUTPATIENT
Start: 2024-02-01 | End: 2024-02-08 | Stop reason: SDUPTHER

## 2024-02-01 ASSESSMENT — ENCOUNTER SYMPTOMS
LOSS OF SENSATION IN FEET: 0
OCCASIONAL FEELINGS OF UNSTEADINESS: 0
DEPRESSION: 0

## 2024-02-01 ASSESSMENT — LIFESTYLE VARIABLES
HOW OFTEN DO YOU HAVE A DRINK CONTAINING ALCOHOL: NEVER
HOW MANY STANDARD DRINKS CONTAINING ALCOHOL DO YOU HAVE ON A TYPICAL DAY: PATIENT DOES NOT DRINK
AUDIT-C TOTAL SCORE: 0
SKIP TO QUESTIONS 9-10: 1
HOW OFTEN DO YOU HAVE SIX OR MORE DRINKS ON ONE OCCASION: NEVER

## 2024-02-01 ASSESSMENT — PATIENT HEALTH QUESTIONNAIRE - PHQ9
1. LITTLE INTEREST OR PLEASURE IN DOING THINGS: NOT AT ALL
2. FEELING DOWN, DEPRESSED OR HOPELESS: NOT AT ALL
SUM OF ALL RESPONSES TO PHQ9 QUESTIONS 1 & 2: 0

## 2024-02-01 ASSESSMENT — PAIN SCALES - GENERAL: PAINLEVEL: 0-NO PAIN

## 2024-02-01 NOTE — PROGRESS NOTES
"Subjective   Jamila Eric is a 89 y.o. female  with h/o graves disease and thyroid nodules here for visit follow up.     Used to follow with Dr. Kannan meyer disease - wo ophthalmopathy- positive TSI in the past  Currently on LT4 50 mcg daily and methimazole 5 mg once daily    # Thyroid nodules, US thyroid 2016, stable subcentimeter multiple nodules no need to repeat for now she does not want it as well, no compression symptoms, does not want any FNA or repeat USG, is aware of risks of malignancy. USG in 2022 Reviewed stable nodules one new TIRAD 3 nodule recheck USG in one year     Per son, Patient takes currently levothyroxine 25  mcg 2 tabs a day and methimazole 5 mg 1 tab daily except Sunday takes 2 tabs.  Last TSH: 0.05 in December. Unclear if any changes were made afterwards     Energy: Good  Sleep: Good. Wakes up to pee once   Weight: Stable  Eats 2 meals a day.   BM: No change   Cold or heat intolerance: No change  Palpitations or tremors: No   HF or NS: No  Cramps: No  Tingling numbness: No  Compressive symptoms:  - Hoarseness: No  - SOB while lying flat: No  - Dysphagia: No  Family history of thyroid cancer:  History of head or neck radiation\"  Lab Results   Component Value Date    TSH 0.03 (L) 12/11/2023    M4XGKLF 5.2 05/16/2022    FREET4 1.19 12/11/2023    T3FREE 2.1 (L) 05/16/2022    Z1VIXOK 102 05/30/2023    THYROIDPAB >1000 (A) 12/15/2021    TSI 7.5 (H) 12/15/2021        Review of Systems  all pertinent systems reviewed and are otherwise negative   Objective   Visit Vitals  /60 (BP Location: Left arm, Patient Position: Sitting, BP Cuff Size: Adult)   Pulse 59   Temp 35.6 °C (96.1 °F) (Temporal)   Ht 1.499 m (4' 11\")   Wt 50.3 kg (111 lb)   SpO2 98%   BMI 22.42 kg/m²   Smoking Status Never   BSA 1.45 m²      Physical Exam  Constitutional:       General: She is not in acute distress.     Appearance: Normal appearance.      Comments: In a wheel chair   Eyes:      Extraocular Movements: " Extraocular movements intact.      Pupils: Pupils are equal, round, and reactive to light.   Cardiovascular:      Rate and Rhythm: Normal rate and regular rhythm.   Pulmonary:      Effort: Pulmonary effort is normal. No respiratory distress.      Breath sounds: Normal breath sounds.   Abdominal:      General: Bowel sounds are normal.      Palpations: Abdomen is soft.      Tenderness: There is no abdominal tenderness.   Skin:     Coloration: Skin is not jaundiced or pale.      Findings: No erythema or rash.   Neurological:      General: No focal deficit present.      Mental Status: She is alert and oriented to person, place, and time.      Deep Tendon Reflexes: Reflexes normal.   Psychiatric:         Mood and Affect: Mood normal.         Behavior: Behavior normal.          Lab Results   Component Value Date    TSH 0.03 (L) 12/11/2023    FREET4 1.19 12/11/2023       Assessment/Plan   Jamila Eric is a 89 y.o. female  with h/o graves disease and thyroid nodules here for visit follow up.   Used to follow with Dr. Brunner  graves disease - wo ophthalmopathy- positive TSI in the past  Currently on LT4 50 mcg daily and methimazole 5 mg once daily    # Thyroid nodules, US thyroid 2016, stable subcentimeter multiple nodules no need to repeat for now she does not want it as well, no compression symptoms, does not want any FNA or repeat USG, is aware of risks of malignancy. USG in 2022 Reviewed stable nodules one new TIRAD 3 nodule recheck USG in one year     Per son, Patient takes currently levothyroxine 25  mcg 2 tabs a day and methimazole 5 mg 1 tab daily except Sunday takes 2 tabs.  Last TSH: 0.05 in December. Unclear if any changes were made afterwards    Plan:  Continue Levothyroxine 25 mcg 2 tablets daily in the morning  Continue methimazole 5 mg 1 tablet daily  Blood work today and we will decide and dosages.  Ultrasound thyroid    Follow up in 6 months    Call her son with results [phone] Joaquin

## 2024-02-01 NOTE — PATIENT INSTRUCTIONS
Continue Levothyroxine 25 mcg 2 tablets daily in the morning  Continue methimazole 5 mg 1 tablet daily  Blood work today and we will decide and dosages.  Ultrasound thyroid    Follow up in 6 months

## 2024-02-02 DIAGNOSIS — N18.30 STAGE 3 CHRONIC KIDNEY DISEASE, UNSPECIFIED WHETHER STAGE 3A OR 3B CKD (MULTI): Primary | ICD-10-CM

## 2024-02-03 RX ORDER — SODIUM BICARBONATE 650 MG/1
650 TABLET ORAL EVERY 12 HOURS
Qty: 180 TABLET | Refills: 3 | Status: SHIPPED | OUTPATIENT
Start: 2024-02-03 | End: 2025-02-02

## 2024-02-06 ENCOUNTER — PATIENT MESSAGE (OUTPATIENT)
Dept: GERIATRIC MEDICINE | Facility: CLINIC | Age: 89
End: 2024-02-06
Payer: MEDICARE

## 2024-02-08 DIAGNOSIS — E05.90 HYPERTHYROIDISM: ICD-10-CM

## 2024-02-08 LAB — TSI SER-ACNC: 4.3 TSI INDEX

## 2024-02-08 RX ORDER — METHIMAZOLE 5 MG/1
5 TABLET ORAL 2 TIMES DAILY
Qty: 180 TABLET | Refills: 3 | Status: SHIPPED | OUTPATIENT
Start: 2024-02-08

## 2024-03-12 ENCOUNTER — APPOINTMENT (OUTPATIENT)
Dept: GERIATRIC MEDICINE | Facility: CLINIC | Age: 89
End: 2024-03-12
Payer: MEDICARE

## 2024-03-19 ENCOUNTER — OFFICE VISIT (OUTPATIENT)
Dept: GERIATRIC MEDICINE | Facility: CLINIC | Age: 89
End: 2024-03-19
Payer: MEDICARE

## 2024-03-19 VITALS
RESPIRATION RATE: 18 BRPM | SYSTOLIC BLOOD PRESSURE: 137 MMHG | WEIGHT: 106 LBS | BODY MASS INDEX: 21.41 KG/M2 | TEMPERATURE: 98.2 F | HEART RATE: 57 BPM | DIASTOLIC BLOOD PRESSURE: 67 MMHG

## 2024-03-19 DIAGNOSIS — I10 HYPERTENSION, UNSPECIFIED TYPE: ICD-10-CM

## 2024-03-19 DIAGNOSIS — E05.90 HYPERTHYROIDISM: ICD-10-CM

## 2024-03-19 DIAGNOSIS — M25.511 CHRONIC RIGHT SHOULDER PAIN: Primary | ICD-10-CM

## 2024-03-19 DIAGNOSIS — G89.29 CHRONIC RIGHT SHOULDER PAIN: Primary | ICD-10-CM

## 2024-03-19 DIAGNOSIS — E55.9 VITAMIN D DEFICIENCY: ICD-10-CM

## 2024-03-19 DIAGNOSIS — E87.5 HYPERKALEMIA: ICD-10-CM

## 2024-03-19 DIAGNOSIS — F03.90 MAJOR NEUROCOGNITIVE DISORDER (MULTI): ICD-10-CM

## 2024-03-19 DIAGNOSIS — D64.9 ANEMIA OF UNKNOWN ETIOLOGY: ICD-10-CM

## 2024-03-19 DIAGNOSIS — N18.31 STAGE 3A CHRONIC KIDNEY DISEASE (MULTI): ICD-10-CM

## 2024-03-19 LAB
25(OH)D3 SERPL-MCNC: 37 NG/ML (ref 30–100)
ALBUMIN SERPL BCP-MCNC: 4.5 G/DL (ref 3.4–5)
ALP SERPL-CCNC: 94 U/L (ref 33–136)
ALT SERPL W P-5'-P-CCNC: 8 U/L (ref 7–45)
ANION GAP SERPL CALC-SCNC: 16 MMOL/L (ref 10–20)
AST SERPL W P-5'-P-CCNC: 19 U/L (ref 9–39)
BILIRUB SERPL-MCNC: 0.5 MG/DL (ref 0–1.2)
BUN SERPL-MCNC: 21 MG/DL (ref 6–23)
CALCIUM SERPL-MCNC: 10.1 MG/DL (ref 8.6–10.6)
CHLORIDE SERPL-SCNC: 105 MMOL/L (ref 98–107)
CO2 SERPL-SCNC: 19 MMOL/L (ref 21–32)
CREAT SERPL-MCNC: 1.48 MG/DL (ref 0.5–1.05)
EGFRCR SERPLBLD CKD-EPI 2021: 33 ML/MIN/1.73M*2
GLUCOSE SERPL-MCNC: 105 MG/DL (ref 74–99)
PHOSPHATE SERPL-MCNC: 3.9 MG/DL (ref 2.5–4.9)
POTASSIUM SERPL-SCNC: 5 MMOL/L (ref 3.5–5.3)
PROT SERPL-MCNC: 7.4 G/DL (ref 6.4–8.2)
SODIUM SERPL-SCNC: 135 MMOL/L (ref 136–145)
VIT B12 SERPL-MCNC: 633 PG/ML (ref 211–911)

## 2024-03-19 PROCEDURE — 3078F DIAST BP <80 MM HG: CPT | Performed by: INTERNAL MEDICINE

## 2024-03-19 PROCEDURE — 1036F TOBACCO NON-USER: CPT | Performed by: INTERNAL MEDICINE

## 2024-03-19 PROCEDURE — 82306 VITAMIN D 25 HYDROXY: CPT | Performed by: INTERNAL MEDICINE

## 2024-03-19 PROCEDURE — 85027 COMPLETE CBC AUTOMATED: CPT | Performed by: INTERNAL MEDICINE

## 2024-03-19 PROCEDURE — 36415 COLL VENOUS BLD VENIPUNCTURE: CPT | Performed by: INTERNAL MEDICINE

## 2024-03-19 PROCEDURE — 84100 ASSAY OF PHOSPHORUS: CPT | Performed by: INTERNAL MEDICINE

## 2024-03-19 PROCEDURE — 82607 VITAMIN B-12: CPT | Performed by: INTERNAL MEDICINE

## 2024-03-19 PROCEDURE — 80053 COMPREHEN METABOLIC PANEL: CPT | Performed by: INTERNAL MEDICINE

## 2024-03-19 PROCEDURE — 99214 OFFICE O/P EST MOD 30 MIN: CPT | Performed by: INTERNAL MEDICINE

## 2024-03-19 PROCEDURE — 1126F AMNT PAIN NOTED NONE PRSNT: CPT | Performed by: INTERNAL MEDICINE

## 2024-03-19 PROCEDURE — 1159F MED LIST DOCD IN RCRD: CPT | Performed by: INTERNAL MEDICINE

## 2024-03-19 PROCEDURE — 3075F SYST BP GE 130 - 139MM HG: CPT | Performed by: INTERNAL MEDICINE

## 2024-03-19 PROCEDURE — 99214 OFFICE O/P EST MOD 30 MIN: CPT | Mod: GC | Performed by: INTERNAL MEDICINE

## 2024-03-19 ASSESSMENT — PATIENT HEALTH QUESTIONNAIRE - PHQ9
1. LITTLE INTEREST OR PLEASURE IN DOING THINGS: NOT AT ALL
2. FEELING DOWN, DEPRESSED OR HOPELESS: NOT AT ALL
SUM OF ALL RESPONSES TO PHQ9 QUESTIONS 1 AND 2: 0

## 2024-03-19 ASSESSMENT — MINI MENTAL STATE EXAM
RECALL THE 3 OBJECTS FROM ABOVE (APPLE, TABLE, PENNY) OR (BALL, TREE, FLAG): 0 CORRECT / UNABLE TO SCORE
SAY:  READ THE WORDS ON THE PAGE AND THEN DO WHAT IT SAYS.  THEN HAND THE PERSON THE SHEET WITH CLOSE YOUR EYES ON IT.  IF THE SUBJECT READS AND DOES NOT CLOSE THEIR EYES, REPEAT UP TO THREE TIMES.  SCORE ONLY IF SUBJECT CLOSES EYES.: 3 CORRECT
PLEASE COPY THIS PICTURE (NOTE ALL 10 ANGLES MUST BE PRESENT AND TWO MUST INTERSECT): 0 CORRECT
SUM ALL MMSE QUESTIONS FOR TOTAL SCORE [OUT OF 30].: 12
SAY: I WOULD LIKE YOU TO REPEAT THIS PHRASE AFTER ME: NO IFS, ANDS, OR BUTS.: 0 CORRECT
WHAT IS THE YEAR, SEASON, DATE, DAY, AND MONTH: 4 CORRECT
NAME OR REPEAT 3 OBJECTS - (APPLE, TABLE, PENNY) OR (BALL, TREE, FLAG): 2 CORRECT
SHOW: PENCIL [OBJECT] ASK: WHAT IS THIS CALLED?: 1 CORRECT
HAND THE PERSON A PENCIL AND PAPER. SAY:  WRITE ANY COMPLETE SENTENCE ON THAT PIECE OF PAPER. (NOTE: THE SENTENCE MUST MAKE SENSE.  IGNORE SPELLING ERRORS): 0 CORRECT
WHAT STATE, COUNTRY, CITY, HOSPITAL, FLOOR: 2 CORRECT
SPELL THE WORD WORLD FORWARD AND BACKWARDS OR SERIAL 7S: 0 CORRECT
PLACE DESIGN, ERASER AND PENCIL IN FRONT OF THE PERSON.  SAY:  COPY THIS DESIGN PLEASE.  SHOW: DESIGN. ALLOW: MULTIPLE TRIES. WAIT UNTIL PERSON IS FINISHED AND HANDS IT BACK. SCORE: ONLY FOR DIAGRAM WITH 4-SIDED FIGURE BETWEEN TWO 5-SIDED FIGURES: 0 CORRECT

## 2024-03-19 ASSESSMENT — ENCOUNTER SYMPTOMS
LOSS OF SENSATION IN FEET: 0
DEPRESSION: 0
OCCASIONAL FEELINGS OF UNSTEADINESS: 0

## 2024-03-19 ASSESSMENT — PAIN SCALES - GENERAL: PAINLEVEL: 0-NO PAIN

## 2024-03-19 NOTE — PATIENT INSTRUCTIONS
Please start taking all evening meds around 8PM before sleeping.   Will do bloodwork today.  Please do rotation exercises of the R arm, continue to use the icy-hot packs on the R arm.  Please follow up with opthalmology for a check up  Follow up in 4 months

## 2024-03-19 NOTE — PROGRESS NOTES
Subjective   Ms. Eric is 89 y.o. year old female and here for f/u of ckd, metabolic acidosis, dementia, HTn, weight loss, hyperthyroidism, anemia, insomnia.  Here with 2 sons and dtr-in-law.    Last visit 09/12/2023  Per pt discussion/summary:   1. schedule follow up with kidney doctor - Dr. Esther Hahn   2. schedule follow up with the thyroid doctor- Dr. Kannan Burgos   3. bring all your pills bottles, pill box and blood pressure meter to the next visit   4. you need assistance with medicines- someone should remind you to take them    5. follow up in 3-4 months  - we will do a memory test at that time (Mini-mental state exam (MMSE))     12/11/2023 ED visit  89-year-old female with history including hypertension, metabolic acidosis on oral bicarb, Graves disease, CKD presenting for dizziness, primarily when standing up. With rest, symptoms resolve. This happened last night and this morning. Denies any additional symptoms. She is well-appearing, no distress. Mentating appropriately. She has no facial asymmetry, speech is fluent. Strength and sensation are intact. No ataxia but orthostatic. She was given IV fluids. Labs were obtained, which I reviewed, and are relatively stable. TSH is low but free T4 normal. Bicarb is slightly lower than most recent labs, no anion gap. I reviewed EKG and agree with resident interpretation. She felt much better on reevaluation, even after only a small amount of fluid received. She is no longer clinically orthostatic and is eager to go home. Results discussed and she was advised to monitor symptoms closely and follow up with her doctor. Advised to continue taking bicarb tabs regularly. Do not feel that she warrants further workup or admission at this time.     ED visit 1/3/2024  Diagnoses as of 01/03/24 1808   Urinary tract infection without hematuria, site unspecified   Clinical Impression  UTI  Dispo  Discharge home    ED admission to inpt 1/13/2024  HOSPITAL COURSE,  DISCHARGE DIAGNOSES, AND PLAN:  Jamila Eric is a 89 y.o. female with a past medical history of hypokalemic metabolic acidosis, HTN, CKD, Graves' disease, and history of urinary retention who presented to the ED with generalized weakness. Notably, the patient was seen and evaluated on 01/03/2024 for a UTI and was prescribed Bactrim.      #Hyperkalemia - resolved  -Highly likely ISO Bactrim and impaired renal function  -Stop Bactrim and avoid this altogether in the future  -Improved w K+ cocktail and IV fluids  -K back to normal prior to dc     #JOSÉ MIGUEL on CKD stage 3b  #Chronic metabolic acidosis  #Hx of SIADH  -Cr improved with IVF and home bicarb; back to baseline 1.5 prior to discharge  -Recommend ongoing hydration at home, but mindful of previously recommended fluid restriction of 1.5L daily     #UTI subacute, treated  -Diagnosed 01/03/2024  -Stop Bactrim altogether as above (course completed)  -Repeat UA without signs of infection  -No urinary symptoms     #HTN  -Controlled  -Continue home medications     #Graves' disease  -No acute issues  -Continue methimazole    HPI   -feels good. No complaints.  -no urinary issues noted today  -BP at homeis around 120-149 SBP. DBP ranges in 70's.   -sleeps well  -mood is well  -no constipation  -no pain anywhere  -eats well as per pt but weight loss of 5 lbs since beginning of 2024  -with the uti, she had hallucinations with the bactrim and therefore was not used but uti went away on its own.  -takes bicarb pill at 1AM, instead of 8pm.  -requires an optho follow up  -states she has R shoulder pain and cannot extend her arms completely above her head. She is able to move the L arm  -no falls  -no trauma to the R arm  -appears to be frozen shoulder  -uses icyheat packs as per son and it gets better.  -does not want to use meds at this time.      Home environment: lives with dtr and son, big house, no issues ambulating without assistance      Alcohol: no  Smoking: no      Is  dependent or requires assistance in the following BADL: dtr helps with bathing, dressing, grooming. no falls   Is dependent or requires assistance in the following Instrumental ADL: dtr helps with cooking, cleaning. son does meds. he fills pill box. dtr does bills. not driving     History of Abuse/Neglect/exploitation: No     Living Will: Y  Durable Power of  of Health Care: Dtr    Current Outpatient Medications   Medication Instructions    amLODIPine (NORVASC) 7.5 mg, Daily    aspirin 81 mg EC tablet 1 tablet, oral, Daily    levothyroxine (SYNTHROID, LEVOXYL) 50 mcg, oral, Daily    methIMAzole (TAPAZOLE) 5 mg, oral, 2 times daily    sodium bicarbonate 650 mg tablet TAKE 1 TABLET BY MOUTH EVERY 12 HOURS    Therems-M 9 mg iron-400 mcg tablet 1 tablet, oral, Daily        Objective   /67   Pulse 57   Temp 36.8 °C (98.2 °F) (Tympanic)   Resp 18   Wt 48.1 kg (106 lb)   BMI 21.41 kg/m²       Physical Exam  Constitutional: No Acute Distress; Well Kempt  Eyes: left pupil enlarged. R eye opacified.   Ears: some wax in both ears. unable to remove with curette  ENT: Pharynx clear, neck supple  Lymphatic: No anterior cervical, supraclavicular adenopathy  Cardiovascular: RRR, +S1, S2, II./VI, physiologic JVD. Extremities: no cyanosis, clubbing, or edema. Pedal pulses intact  Respiratory: clear without rales, rhonchi or wheezes  Gastrointestinal: +BS, soft, nontender  Genitourinary: not examined  Musculoskeletal: slight kyphosis  Integumentary: skin warm, no tenting, no remarkable lesions  Neurological: non-focal deficit. Get-up-and-go: slightly pushes to stand Gait: stable without device  Psychiatric: affect full       Lab Results   Component Value Date    TSH 0.01 (L) 02/01/2024    TSH 0.03 (L) 12/11/2023    TSH 0.05 (L) 09/05/2023     Lab Results   Component Value Date    FREET4 1.14 02/01/2024    FREET4 1.19 12/11/2023    FREET4 1.28 09/05/2023     Lab Results   Component Value Date    YWZDGSPK91 >2000 (A)  04/20/2021    [id number] 521 06/22/2018     Lab Results   Component Value Date    HGBA1C 5.4 11/20/2021    HGBA1C 4.1 05/05/2021     Lab Results   Component Value Date    VITD25 33 03/14/2023    VITD25 75 05/05/2021    VITD25 54 04/20/2021        XR DEXA bone density     Narrative  Interpreted By:  JEANETTE OHARA MD  Name: CARLA ARBOLEDA  Patient ID: [id number] YOB: 1932 Height: 58.0 in.  Gender:     Female   Exam Date:  04/19/2023 Weight: 111.0 lbs.  Indications: Osteopenia, Post Menopausal, Screening  Fractures:  Treatments:    LEFT FEMUR - TOTAL  The bone mineral density : 0.697 g/cm2  T-score : -2.5    % of young normal mean :69 %  Z-score : -0.9    % of age matched mean : 87 %  % change vs. Previous : -     % change vs. Baseline : baseline  *Indicates significant change based on 95% confidence interval.    LEFT FEMUR - NECK  The bone mineral density : 0.709 g/cm2  T-score : -2.4    % of young normal mean: 68 %  Z-score : -0.7    % of age matched mean : 89 %  % change vs. Previous : -     % change vs. Baseline : baseline  *Indicates significant change based on 95% confidence interval.    SPINE L1-L4  The bone mineral density is 1.022 g/cm2  T-score : -1.3   % of young normal mean is 87 %  Z-score : 0.0    % of age matched mean is 100 %  % change vs. Previous : -     % change vs. Baseline : baseline  *Indicates significant change based on 95% confidence interval.    World Health Organization (WHO) criteria for post-menopausal,   Women:  Normal:       T-score at or above -1 SD  Osteopenia:   T-score between -1 and -2.5 SD  Osteoporosis: T-score at or below -2.5 SD    10-Year Fracture Risk:  Major Osteoporotic Fracture -  Hip Fracture                -  Reported Risk Factors       None    Interpretation:  According to World Health Organization criteria, classification is  osteoporosis.    Follow-up DEXA and treatment is recommended.    All images and detailed analysis are available on the   Radiology  PACS.     CT head wo IV contrast 01/12/2024    Narrative  Interpreted By:  Raysa Webster and Stephens Katherine  STUDY:  CT HEAD WO IV CONTRAST  1/12/2024 8:47 pm    INDICATION:  Signs/Symptoms:hypertensive, syncope    COMPARISON:  CT head 01/21/2022    ACCESSION NUMBER(S):  [id number]    ORDERING CLINICIAN:  BRUNILDA VIDAL    TECHNIQUE:  Serial, axial CT images of the brain were obtained without IV  contrast. Coronal and sagittal reformatted images were performed.    FINDINGS:  The ventricles, cisterns and sulci are prominent, consistent with  diffuse volume loss.  There are areas of nonspecific white matter  hypodensity, which are probably age-related or microvascular in  nature. The gray-white matter differentiation is intact and there is  no evidence of acute territorial infarct.  No mass effect or midline  shift is seen.  There is no hemorrhage.  No extraaxial fluid  collection. No air-fluid levels at the visualized paranasal sinuses.  The mastoid air cells are clear. No depressed calvarial fracture.    Impression  1.  No acute intracranial hemorrhage or acute territorial infarct.        I personally reviewed the images/study and I agree with the findings  as stated.    MACRO:  None.    Signed by: Raysa Webster 1/12/2024 11:01 PM  Dictation workstation:   [id number]     No results found for this or any previous visit from the past 365 days.    Component      Latest Ref Rng 1/12/2024 1/13/2024   GLUCOSE      74 - 99 mg/dL 99  76    GLUCOSE        84    SODIUM      136 - 145 mmol/L 134 (L)  138    SODIUM        134 (L)    POTASSIUM      3.5 - 5.3 mmol/L 6.7 (HH)  4.4    POTASSIUM       7.0 (HH)  5.5 (H)    CHLORIDE      98 - 107 mmol/L 110 (H)  114 (H)    CHLORIDE        106    Bicarbonate      21 - 32 mmol/L 13 (L)  14 (L)    Bicarbonate        19 (L)    Anion Gap      10 - 20 mmol/L 18  14    Anion Gap        15    Blood Urea Nitrogen      6 - 23 mg/dL 27 (H)  18    Blood Urea Nitrogen         22    Creatinine      0.50 - 1.05 mg/dL 2.32 (H)  1.53 (H)    Creatinine        1.97 (H)    Calcium      8.6 - 10.6 mg/dL 9.9  7.8 (L)    Calcium        10.2    Albumin      3.4 - 5.0 g/dL 4.4  3.1 (L)    Albumin        4.2    PHOSPHORUS      2.5 - 4.9 mg/dL 3.3  2.6    PHOSPHORUS        2.9    EGFR      >60 mL/min/1.73m*2 19 (L)  32 (L)    EGFR        24 (L)       Component      Latest Ref Rn 1/12/2024   WBC      4.4 - 11.3 x10*3/uL 6.3    nRBC      0.0 - 0.0 /100 WBCs 0.0    RBC      4.00 - 5.20 x10*6/uL 4.79    HEMOGLOBIN      12.0 - 16.0 g/dL 13.4    HEMATOCRIT      36.0 - 46.0 % 39.4    MCV      80 - 100 fL 82    MCHC      32.0 - 36.0 g/dL 34.0    Platelets      150 - 450 x10*3/uL 294    RED CELL DISTRIBUTION WIDTH      11.5 - 14.5 % 15.9 (H)    Neutrophils %      40.0 - 80.0 % 54.0    Immature Granulocytes %, Automated      0.0 - 0.9 % 0.3    Lymphocytes %      13.0 - 44.0 % 36.9    Monocytes %      2.0 - 10.0 % 8.3    Eosinophils %      0.0 - 6.0 % 0.2    Basophils %      0.0 - 2.0 % 0.3    Neutrophils Absolute      1.60 - 5.50 x10*3/uL 3.40    Lymphocytes Absolute      0.80 - 3.00 x10*3/uL 2.32    Monocytes Absolute      0.05 - 0.80 x10*3/uL 0.52    Eosinophils Absolute      0.00 - 0.40 x10*3/uL 0.01    Basophils Absolute      0.00 - 0.10 x10*3/uL 0.02    MCH      26.0 - 34.0 pg 28.0    Immature Granulocytes Absolute, Automated      0.00 - 0.50 x10*3/uL 0.02             Assessment/Plan     R shoulder adhesive capsulitis   -states she has R shoulder pain and cannot extend her arms completely above her head. She is able to move the L arm  -uses icyheat packs as per son and it gets better.  -does not want to use meds at this time.  -does not want PT at this time, pt asked to call back in if it gets worse  -home exercises discussed     2. HTN   - BP stable today on amlodipine 7.5mg daily.   - son checks bps at home ranges from 120-139/70-80. son filling pill box and dtr-in-law gives it to her.      2.  metabolic acidosis  3. CKD  4. Hyponatremia/Hyperkalemia  -found to have significant metabolic acidosis and hypokalemia when in hospital in 4/2021. etiology not completely clear. put on sodium bicarbonate 650mg BID. after that hospitalization her kidney function and acidosis improved. last saw nephrology 11/2022.   -had hyperkalemia with sebastian on ckd during UTI admission in 1/2024. Hyperkalemia resolved before discharged. Will check bmp today to follow up kidney function, latest egfr is 32, with cr. of 1.53 on 1/13/2024. Will check bmp today  -pt has apt with neprho coming up 4/2024     5. hyperthyroidism  - following with endocrine. her thyroid medicines have been gradually adjusted. Currently on levothyroxine 50mcg daily and methimazole 5mg BID after most recent thyroid values done in 2/2024 of TSH 0.01, TSI 4.23, with normal T3 115, and t4 of 1.14. of note, no evidence of fluid overload. advised her to follow up with endocrine     6. anemia  - hemoglobin had been 8-9 in early 2021. improved since then. was 13.4 in 1/2024.      7. major neurocognitive disorder due to multiple etiologies   -in past she would say she was on a memory pill but it turned out to just be therems-M- a multivitamin. does have some forgetfulness. scored 10/22 on blind MoCA and 14/22 on blind MMSE in 12/2020.   -she is getting help with medicines (from her son) and most IADLs. Son fills pill box but dtr-in-law gives it to her.   - suspect she may need some cues and prompting for BADLs as well. does live with 3 children and has help at home. Memory is stable per pt and family.  -MMSE shows 13/30 today. Loss of points in recall, attention, repetition, reading, writing, drawing.     8. insomnia  -had previously been an issue. family noted at last visit that she is sleeping better now. had once prescribed mirtazapine but not clear she ever took it.      9. osteopenia  T -1.6 in L femoral neck in 2012. alendronate is on past med list from 2012 to  2013. not clear if she took it. family not aware of this. mentioned possibly repeating bone density at last visit but family noted that they felt she was ok and didn't need this. rediscussed bone density at last visit and pt and family agreed to do this to determine if there is significant risk for fracture. this hasn't been done yet. will rediscuss repeating Dexa at next visit     10. Health maintenance   - she refuses immunizations including covid and flu  - says she doesn't have HCPOA or living will documents. gave her a copy of these today.   - wishes to be full code. will further discuss this with her and family in future     follow up with Dr. Hayden in 4 months     Joyce Bill MD  Geriatric Fellow    Attending Addendum:  Chart reviewed, patient examined, labs/imaging reviewed, and key elements of the history and physical examination of the patient confirmed.  I reviewed the fellow's note and made edits where needed in italics. I agree with the documented findings and plan of care.    MD Emiliana Upton MD

## 2024-03-20 LAB
ERYTHROCYTE [DISTWIDTH] IN BLOOD BY AUTOMATED COUNT: 14.4 % (ref 11.5–14.5)
HCT VFR BLD AUTO: 35.3 % (ref 36–46)
HGB BLD-MCNC: 11.4 G/DL (ref 12–16)
MCH RBC QN AUTO: 28.9 PG (ref 26–34)
MCHC RBC AUTO-ENTMCNC: 32.3 G/DL (ref 32–36)
MCV RBC AUTO: 89 FL (ref 80–100)
NRBC BLD-RTO: 0 /100 WBCS (ref 0–0)
PLATELET # BLD AUTO: 398 X10*3/UL (ref 150–450)
RBC # BLD AUTO: 3.95 X10*6/UL (ref 4–5.2)
WBC # BLD AUTO: 7.6 X10*3/UL (ref 4.4–11.3)

## 2024-04-11 ENCOUNTER — APPOINTMENT (OUTPATIENT)
Dept: NEPHROLOGY | Facility: CLINIC | Age: 89
End: 2024-04-11
Payer: MEDICARE

## 2024-04-19 ENCOUNTER — APPOINTMENT (OUTPATIENT)
Dept: UROLOGY | Facility: HOSPITAL | Age: 89
End: 2024-04-19
Payer: MEDICARE

## 2024-07-01 DIAGNOSIS — I10 BENIGN ESSENTIAL HYPERTENSION: ICD-10-CM

## 2024-07-01 DIAGNOSIS — N18.30 STAGE 3 CHRONIC KIDNEY DISEASE, UNSPECIFIED WHETHER STAGE 3A OR 3B CKD (MULTI): ICD-10-CM

## 2024-07-01 DIAGNOSIS — I10 ESSENTIAL (PRIMARY) HYPERTENSION: ICD-10-CM

## 2024-07-01 DIAGNOSIS — E05.90 HYPERTHYROIDISM: ICD-10-CM

## 2024-07-01 RX ORDER — METHIMAZOLE 5 MG/1
5 TABLET ORAL 2 TIMES DAILY
Qty: 180 TABLET | Refills: 3 | Status: SHIPPED | OUTPATIENT
Start: 2024-07-01

## 2024-07-01 RX ORDER — SODIUM BICARBONATE 650 MG/1
650 TABLET ORAL EVERY 12 HOURS
Qty: 180 TABLET | Refills: 3 | Status: SHIPPED | OUTPATIENT
Start: 2024-07-01 | End: 2025-07-01

## 2024-07-01 RX ORDER — ASPIRIN 81 MG/1
81 TABLET ORAL DAILY
Qty: 90 TABLET | Refills: 3 | Status: SHIPPED | OUTPATIENT
Start: 2024-07-01

## 2024-07-01 RX ORDER — AMLODIPINE BESYLATE 5 MG/1
7.5 TABLET ORAL DAILY
Qty: 135 TABLET | Refills: 3 | Status: SHIPPED | OUTPATIENT
Start: 2024-07-01

## 2024-07-01 RX ORDER — LEVOTHYROXINE SODIUM 25 UG/1
50 TABLET ORAL DAILY
Qty: 90 TABLET | Refills: 3 | Status: SHIPPED | OUTPATIENT
Start: 2024-07-01

## 2024-07-01 RX ORDER — MULTIVIT,CALC,MINS/IRON/FOLIC 9MG-400MCG
1 TABLET ORAL DAILY
Qty: 90 TABLET | Refills: 3 | Status: SHIPPED | OUTPATIENT
Start: 2024-07-01

## 2024-07-15 NOTE — PROGRESS NOTES
"Subjective   Ms. Eric is 89 y.o. year old female and here for f/u of ckd, metabolic acidosis, dementia, HTn, weight loss, hyperthyroidism, anemia, insomnia.  Here with dtr Leia    Last visit 3/19/24  Per pt discussion/summary:   Please start taking all evening meds around 8PM before sleeping.   Will do bloodwork today.  Please do rotation exercises of the R arm, continue to use the icy-hot packs on the R arm.  Please follow up with opthalmology for a check up  Follow up in 4 months    Labs summary to son on 3/23/24  \"Overall, blood work is stable. And kidney function is improved compared to earlier this year.   Her vitamin D level is low normal. If she is not taking any vitamin D supplement, would recommend starting vitamin D3 1000 units daily. If she is already taking vitamin D3, then would recommend taking 2 capsules daily. \"    HPI     Per patient and dtr (obtained in addition due to dementia)  - R shoulder is bothering her still  - can't move that shoulder much  - takes motrin once per day per patient. Takes 1 tab. Not in pill box  - tries to do exercises per pt  - hasn't done PT  - not putting anything topical   - no sob or cp  - no dizziness or lightheadedness  - no falls  - weight up 3 lbs from march. But down 2 lbs from 9/2023  - bowels moving   - no urine leakage.   - chinmay pineda checks bp at home  - chinmay tena does medicine   - when i asked about her memnory, dtr says \"give her some money. She can count good\"  - dtr thinks she is no longer seeing thyroid doctor,        Home environment: lives with dtr Asia and son Mikael and edwin, big house, no issues ambulating without assistance      Alcohol: no  Smoking: no      Is dependent or requires assistance in the following BADL: dtr helps with bathing, dressing, grooming. no falls   Is dependent or requires assistance in the following Instrumental ADL: dtr helps with cooking, cleaning. son does meds. he fills pill box. dtr does bills. not driving   "   History of Abuse/Neglect/exploitation: No     Living Will: Y  Durable Power of  of Health Care: Dtr      Medications reviewed and reconciled.     Objective   /64   Pulse 67   Temp 36.9 °C (98.4 °F) (Tympanic)   Resp 16   Wt 49.6 kg (109 lb 4.8 oz)   BMI 22.08 kg/m²     Physical Exam  Constitutional: No Acute Distress; Well Kempt  Eyes: PERRLA  Ears: auditory canals clear, TM's +LR b/l  ENT: Pharynx clear, neck supple  Lymphatic: No anterior cervical, supraclavicular adenopathy  Cardiovascular: RRR, +S1, S2, II/VI GILDARDO RUSB, physiologic JVD.  Extremities: no cyanosis, clubbing, or edema. Pedal pulses intact  Respiratory: clear without rales, rhonchi or wheezes  Gastrointestinal: +BS, soft, nontender  Genitourinary: not examined  Musculoskeletal: slight kyphosis of spine  Integumentary: skin warm, no tenting, no remarkable lesions  Neurological: non-focal deficit. Get-up-and-go: slightly pushes to stand. Ok step length and height   Gait: stable without device   Psychiatric: affect full     Component  Ref Range & Units 3 mo ago  (3/19/24) 6 mo ago  (1/12/24) 6 mo ago  (1/3/24) 7 mo ago  (12/11/23) 10 mo ago  (9/2/23) 10 mo ago  (9/1/23) 1 yr ago  (3/14/23)   WBC  4.4 - 11.3 x10*3/uL 7.6 6.3 6.7 7.5 6.4 R 8.2 R 9.0 R   Hemoglobin  12.0 - 16.0 g/dL 11.4 Low  13.4 12.4 11.3 Low  12.3 10.8 Low  11.4 Low    Hematocrit  36.0 - 46.0 % 35.3 Low  39.4 35.1 Low  32.3 Low  35.7 Low  28.8 Low  34.8 Low    MCV  80 - 100 fL 89 82 81 82 84 78 Low  88   RDW  11.5 - 14.5 % 14.4 15.9 High  13.7 13.8 15.0 High  14.2 13.2   Platelets  150 - 450 x10*3/uL 398 294 333 318 200 R 391 R 415 R     Component  Ref Range & Units 3 mo ago  (3/19/24) 6 mo ago  (1/13/24) 6 mo ago  (1/13/24) 6 mo ago  (1/12/24) 6 mo ago  (1/12/24) 6 mo ago  (1/3/24) 7 mo ago  (12/11/23)   Glucose  74 - 99 mg/dL 105 High  76 84  99 105 High  103 High    Sodium  136 - 145 mmol/L 135 Low  138 134 Low   134 Low  130 Low  137   Potassium  3.5 - 5.3  "mmol/L 5.0 4.4 5.5 High  6.7 High Panic  CM 7.0 High Panic  4.4 4.8 CM   Chloride  98 - 107 mmol/L 105 114 High  106  110 High  103 109 High    Bicarbonate  21 - 32 mmol/L 19 Low  14 Low  19 Low   13 Low  18 Low  17 Low    Anion Gap  10 - 20 mmol/L 16 14 15  18 13 16   Urea Nitrogen  6 - 23 mg/dL 21 18 22  27 High  13 15   Creatinine  0.50 - 1.05 mg/dL 1.48 High  1.53 High  1.97 High   2.32 High  1.14 High  1.20 High    eGFR  >60 mL/min/1.73m*2 33 Low  32 Low  CM 24 Low  CM  19 Low  CM 46 Low  CM 43 Low  CM   Comment: Calculations of estimated GFR are performed using the 2021 CKD-EPI Study Refit equation without the race variable for the IDMS-Traceable creatinine methods.  https://jasn.asnjournals.org/content/early/2021/09/22/ASN.0651031029   Calcium  8.6 - 10.6 mg/dL 10.1 7.8 Low  10.2  9.9 10.3 9.5   Albumin  3.4 - 5.0 g/dL 4.5 3.1 Low  4.2  4.4 4.4 4.1   Alkaline Phosphatase  33 - 136 U/L 94    90 111 109   Total Protein  6.4 - 8.2 g/dL 7.4    7.9 7.5 7.2   AST  9 - 39 U/L 19    22 17 21 CM   Bilirubin, Total  0.0 - 1.2 mg/dL 0.5    0.4 0.4 0.3   ALT  7 - 45 U/L 8    10 CM 7 CM 9 CM       Lab Results   Component Value Date    TSH 0.01 (L) 02/01/2024    TSH 0.03 (L) 12/11/2023    TSH 0.05 (L) 09/05/2023     Lab Results   Component Value Date    FREET4 1.14 02/01/2024    FREET4 1.19 12/11/2023    FREET4 1.28 09/05/2023     Lab Results   Component Value Date    GZTQIYQU91 633 03/19/2024    OOGXKPDK30 >2000 (A) 04/20/2021    SWIHCEMN62 521 06/22/2018     Lab Results   Component Value Date    HGBA1C 5.4 11/20/2021    HGBA1C 4.1 05/05/2021     Lab Results   Component Value Date    VITD25 37 03/19/2024    VITD25 33 03/14/2023    VITD25 75 05/05/2021      DeXA 4/2023  \"LEFT FEMUR - TOTAL   The bone mineral density : 0.697 g/cm2   T-score : -2.5    % of young normal mean :69 %   Z-score : -0.9    % of age matched mean : 87 %    % change vs. Previous : -     % change vs. Baseline : baseline    *Indicates significant change " "based on 95% confidence interval.   LEFT FEMUR - NECK   The bone mineral density : 0.709 g/cm2   T-score : -2.4    % of young normal mean: 68 %   Z-score : -0.7    % of age matched mean : 89 %    % change vs. Previous : -     % change vs. Baseline : baseline    *Indicates significant change based on 95% confidence interval.   SPINE L1-L4   The bone mineral density is 1.022 g/cm2   T-score : -1.3   % of young normal mean is 87 %   Z-score : 0.0    % of age matched mean is 100 %    % change vs. Previous : -     % change vs. Baseline : baseline    *Indicates significant change based on 95% confidence interval. \"     CT head wo IV contrast 01/12/2024  FINDINGS:  The ventricles, cisterns and sulci are prominent, consistent with diffuse volume loss.  There are areas of nonspecific white matter hypodensity, which are probably age-related or microvascular in nature. The gray-white matter differentiation is intact and there is no evidence of acute territorial infarct.  No mass effect or midline  shift is seen.  There is no hemorrhage.  No extraaxial fluid  collection. No air-fluid levels at the visualized paranasal sinuses.  The mastoid air cells are clear. No depressed calvarial fracture.  Impression  1.  No acute intracranial hemorrhage or acute territorial infarct.    Assessment/Plan   R shoulder adhesive capsulitis   -states she has R shoulder pain and cannot extend her arms. Of note, passive range of motion was about the same in both shoulders but more popping/cracking in R shoulder. Currently take motrin for pain. Advised stopping this. Start tylenol 1000mg daily in morning (advised son to put in her pill box). Then can take 2 additional tabs in afternoon if needed. Also advised using voltaren gel 2 gram up to 3 times per day. Will refer for  home care for home PT. Advised doing home PT exercises regularly     2. HTN   - BP elevated to in 150s-160s systolic.   - currently on amlodipine 5mg- 1.5 tabs daily. The son " who fills pill box is not here today to confirm that this is the dose he is giving her.   - son may be checking bps at home. Will monitor     3. metabolic acidosis  4. CKD  5. Hyponatremia/Hyperkalemia  -found to have significant metabolic acidosis and hypokalemia when in hospital in 4/2021. etiology not completely clear. put on sodium bicarbonate 650mg BID. after that hospitalization her kidney function and acidosis improved. CMP in 3/2024 showed k+ 5.0 and sodium 135. last saw nephrology 11/2022. Cancelled last appt scheduled for 4/2024. Hs upcoming appt on 9/5/24. Advised her to keep this appt.        6. Hyperthyroidism/thyroid nodules  - following with endocrine. her thyroid medicines have been gradually adjusted. Currently on levothyroxine 50mcg daily and methimazole 5mg BID after most recent thyroid values done in 2/2024 of TSH 0.01, TSI 4.23, with normal T3 115, and t4 of 1.14.  advised scheduling a f/up appt with endocrine with blood work done prior and also a follow up thyroid ultrasound, which had been ordered in 2/2024.      7. anemia  - hemoglobin had been 8-9 in early 2021. improved since then. Was 11.4 in 3/2024. Will monitor    8. major neurocognitive disorder due to multiple etiologies   -in past she would say she was on a memory pill but it turned out to just be therems-M- a multivitamin. does have some forgetfulness. scored 10/22 on blind MoCA and 14/22 on blind MMSE in 12/2020.  13/30 on MMSE in 3/2024.  -she is getting help with medicines (from her son) and most IADLs. Son fills pill box but dtr-in-law gives it to her.   - suspect she may need some cues and prompting for BADLs as well. does live with 3 children and has help at home. Memory is stable per pt and family.      9. osteopenia  T -1.6 in L femoral neck in 2012. alendronate is on past med list from 2012 to 2013. not clear if she took it. family not aware of this. Repeat DeXA 3/2023 showed T -2.5 in total L femur (-2.4 in fem neck). Would  like benefit from medication. Will discuss with pt and family at next visit     10. Health maintenance   - she refuses immunizations including covid and flu  - says she doesn't have HCPOA or living will documents. gave her a copy of these at last visit  - wished to be full code at last visit. will further discuss this with her and family in future     follow up with Dr. Hayden in 4 months     Emiliana Hayden MD

## 2024-07-16 ENCOUNTER — OFFICE VISIT (OUTPATIENT)
Dept: GERIATRIC MEDICINE | Facility: CLINIC | Age: 89
End: 2024-07-16
Payer: MEDICARE

## 2024-07-16 VITALS
TEMPERATURE: 98.4 F | SYSTOLIC BLOOD PRESSURE: 165 MMHG | DIASTOLIC BLOOD PRESSURE: 64 MMHG | HEART RATE: 67 BPM | RESPIRATION RATE: 16 BRPM | WEIGHT: 109.3 LBS | BODY MASS INDEX: 22.08 KG/M2

## 2024-07-16 DIAGNOSIS — E55.9 VITAMIN D DEFICIENCY: ICD-10-CM

## 2024-07-16 DIAGNOSIS — E87.1 HYPONATREMIA: ICD-10-CM

## 2024-07-16 DIAGNOSIS — Z00.00 HEALTH CARE MAINTENANCE: ICD-10-CM

## 2024-07-16 DIAGNOSIS — M25.511 CHRONIC RIGHT SHOULDER PAIN: Primary | ICD-10-CM

## 2024-07-16 DIAGNOSIS — G89.29 CHRONIC RIGHT SHOULDER PAIN: Primary | ICD-10-CM

## 2024-07-16 DIAGNOSIS — E87.20 METABOLIC ACIDOSIS: ICD-10-CM

## 2024-07-16 DIAGNOSIS — I10 HYPERTENSION, UNSPECIFIED TYPE: ICD-10-CM

## 2024-07-16 DIAGNOSIS — N18.31 STAGE 3A CHRONIC KIDNEY DISEASE (MULTI): ICD-10-CM

## 2024-07-16 DIAGNOSIS — M85.80 OSTEOPENIA, UNSPECIFIED LOCATION: ICD-10-CM

## 2024-07-16 DIAGNOSIS — D64.9 ANEMIA OF UNKNOWN ETIOLOGY: ICD-10-CM

## 2024-07-16 DIAGNOSIS — F03.90 MAJOR NEUROCOGNITIVE DISORDER (MULTI): ICD-10-CM

## 2024-07-16 DIAGNOSIS — E05.90 HYPERTHYROIDISM: ICD-10-CM

## 2024-07-16 PROCEDURE — 99214 OFFICE O/P EST MOD 30 MIN: CPT | Performed by: INTERNAL MEDICINE

## 2024-07-16 PROCEDURE — 1125F AMNT PAIN NOTED PAIN PRSNT: CPT | Performed by: INTERNAL MEDICINE

## 2024-07-16 PROCEDURE — 3078F DIAST BP <80 MM HG: CPT | Performed by: INTERNAL MEDICINE

## 2024-07-16 PROCEDURE — 3077F SYST BP >= 140 MM HG: CPT | Performed by: INTERNAL MEDICINE

## 2024-07-16 ASSESSMENT — PATIENT HEALTH QUESTIONNAIRE - PHQ9
2. FEELING DOWN, DEPRESSED OR HOPELESS: NOT AT ALL
SUM OF ALL RESPONSES TO PHQ9 QUESTIONS 1 AND 2: 0
1. LITTLE INTEREST OR PLEASURE IN DOING THINGS: NOT AT ALL

## 2024-07-16 ASSESSMENT — PAIN SCALES - GENERAL: PAINLEVEL: 8

## 2024-07-16 ASSESSMENT — ENCOUNTER SYMPTOMS
LOSS OF SENSATION IN FEET: 0
OCCASIONAL FEELINGS OF UNSTEADINESS: 0

## 2024-07-16 NOTE — PATIENT INSTRUCTIONS
You have a follow up visit with kidney doctor in September    2. Make follow up appointment with the thyroid (endocrinologist) doctor for next month if possible    3. Have the thyroid ultrasound done    4. We felipe blood today for the thyroid doctor- to check kidneys, electrolytes, and your thyroid    5. Referral placed to  home care for home physical therapy and nursing (for blood pressure checks)    6. Stop the motrin     7. Start tylenol extra strength  - 2 tabs in the morning  - put them in the pill box with the rest of medicines    - ok to take 2 additional tylenol in the afternoon or evening as needed    8. Use voltaren gel 1% - apply 2 grams to the right shoulder 3 times per day as needed    9. Do the physical therapy exercises for your shoulder    10. Follow up visit in 4 months

## 2024-07-17 ENCOUNTER — TELEPHONE (OUTPATIENT)
Dept: HOME HEALTH SERVICES | Facility: HOME HEALTH | Age: 89
End: 2024-07-17
Payer: MEDICARE

## 2024-07-17 NOTE — TELEPHONE ENCOUNTER
Good morning, thank you for the referral to  Home care. In order for us to move forward, we will need the most recent visit note completed and signed.     Thank you,   [facility]

## 2024-07-21 ENCOUNTER — HOME HEALTH ADMISSION (OUTPATIENT)
Dept: HOME HEALTH SERVICES | Facility: HOME HEALTH | Age: 89
End: 2024-07-21
Payer: MEDICARE

## 2024-07-22 ENCOUNTER — DOCUMENTATION (OUTPATIENT)
Dept: HOME HEALTH SERVICES | Facility: HOME HEALTH | Age: 89
End: 2024-07-22
Payer: MEDICARE

## 2024-07-22 NOTE — HH CARE COORDINATION
Home Care received a Referral for Nursing and Physical Therapy. We have processed the referral for a Start of Care on 7/25-7/26.     If you have any questions or concerns, please feel free to contact us at [phone]. Follow the prompts, enter your five digit zip code, and you will be directed to your care team on [facility] 3.

## 2024-07-24 ENCOUNTER — HOME CARE VISIT (OUTPATIENT)
Dept: HOME HEALTH SERVICES | Facility: HOME HEALTH | Age: 89
End: 2024-07-24
Payer: MEDICARE

## 2024-07-24 ENCOUNTER — HOSPITAL ENCOUNTER (OUTPATIENT)
Dept: RADIOLOGY | Facility: HOSPITAL | Age: 89
Discharge: HOME | End: 2024-07-24
Payer: MEDICARE

## 2024-07-24 VITALS
TEMPERATURE: 98.7 F | RESPIRATION RATE: 16 BRPM | HEART RATE: 66 BPM | OXYGEN SATURATION: 95 % | DIASTOLIC BLOOD PRESSURE: 60 MMHG | SYSTOLIC BLOOD PRESSURE: 130 MMHG

## 2024-07-24 DIAGNOSIS — E05.90 HYPERTHYROIDISM: ICD-10-CM

## 2024-07-24 DIAGNOSIS — E05.20 TOXIC MULTINODULAR GOITER: ICD-10-CM

## 2024-07-24 PROCEDURE — 169592 NO-PAY CLAIM PROCEDURE

## 2024-07-24 PROCEDURE — G0299 HHS/HOSPICE OF RN EA 15 MIN: HCPCS | Mod: HHH

## 2024-07-24 PROCEDURE — 76536 US EXAM OF HEAD AND NECK: CPT | Performed by: RADIOLOGY

## 2024-07-24 PROCEDURE — 76536 US EXAM OF HEAD AND NECK: CPT

## 2024-07-24 ASSESSMENT — ENCOUNTER SYMPTOMS: DENIES PAIN: 1

## 2024-07-24 ASSESSMENT — ACTIVITIES OF DAILY LIVING (ADL): ENTERING_EXITING_HOME: MINIMUM ASSIST

## 2024-07-26 ENCOUNTER — HOME CARE VISIT (OUTPATIENT)
Dept: HOME HEALTH SERVICES | Facility: HOME HEALTH | Age: 89
End: 2024-07-26
Payer: MEDICARE

## 2024-07-26 PROCEDURE — G0151 HHCP-SERV OF PT,EA 15 MIN: HCPCS | Mod: HHH

## 2024-07-26 SDOH — HEALTH STABILITY: PHYSICAL HEALTH: EXERCISE ACTIVITIES SETS: 2

## 2024-07-26 SDOH — HEALTH STABILITY: PHYSICAL HEALTH: EXERCISE TYPE: RIGHT SHOULDER ROM

## 2024-07-26 SDOH — ECONOMIC STABILITY: HOUSING INSECURITY: OPEN FLAME PRESENT: 1

## 2024-07-26 SDOH — HEALTH STABILITY: PHYSICAL HEALTH: PHYSICAL EXERCISE: 10

## 2024-07-26 SDOH — HEALTH STABILITY: PHYSICAL HEALTH: PHYSICAL EXERCISE: SUPINE

## 2024-07-26 SDOH — ECONOMIC STABILITY: HOUSING INSECURITY
HOME SAFETY: THERAPIST DISCUSSED WITH PATIENT AND CAREGIVER IMPORTANCE OF HOME SAFETY, SPECIFICALLY FOR CLUTTER FREE WALKING PATHWAYS AND ADEQUATE LIGHTING

## 2024-07-26 SDOH — HEALTH STABILITY: PHYSICAL HEALTH: EXERCISE COMMENTS: RIGHT SHOULDER ROM ACTIVITIES INCLUDE: SHOULDER FLEXION, ABDUCTION, EXTERNAL AND INTERNAL ROTATION

## 2024-07-26 SDOH — HEALTH STABILITY: PHYSICAL HEALTH: EXERCISE ACTIVITY: RIGHT SHOULDER ROM

## 2024-07-26 ASSESSMENT — ENCOUNTER SYMPTOMS
LIMITED RANGE OF MOTION: 1
OCCASIONAL FEELINGS OF UNSTEADINESS: 0
LOSS OF SENSATION IN FEET: 0
SUBJECTIVE PAIN PROGRESSION: RAPIDLY IMPROVING
PAIN LOCATION - PAIN SEVERITY: 3/10
PAIN LOCATION: RIGHT SHOULDER
PERSON REPORTING PAIN: PATIENT
PAIN LOCATION - PAIN FREQUENCY: FREQUENT
ARTHRALGIAS: 1
PAIN LOCATION - RELIEVING FACTORS: ICY HOT
PAIN: PATIENT DILIGENT WITH PAIN CONTROL TECHNIQUES
LOWEST PAIN SEVERITY IN PAST 24 HOURS: 2/10
PAIN LOCATION - EXACERBATING FACTORS: SIDELYING
DEPRESSION: 0
PAIN: 1
HIGHEST PAIN SEVERITY IN PAST 24 HOURS: 5/10
PAIN SEVERITY GOAL: 2/10

## 2024-07-26 ASSESSMENT — ACTIVITIES OF DAILY LIVING (ADL)
AMBULATION ASSISTANCE ON FLAT SURFACES: 1
PHYSICAL TRANSFERS ASSESSED: 1
AMBULATION ASSISTANCE: INDEPENDENT
CURRENT_FUNCTION: INDEPENDENT
AMBULATION ASSISTANCE: 1

## 2024-07-27 ASSESSMENT — ACTIVITIES OF DAILY LIVING (ADL): OASIS_M1830: 03

## 2024-07-27 ASSESSMENT — ENCOUNTER SYMPTOMS: APPETITE LEVEL: GOOD

## 2024-07-30 ENCOUNTER — HOME CARE VISIT (OUTPATIENT)
Dept: HOME HEALTH SERVICES | Facility: HOME HEALTH | Age: 89
End: 2024-07-30
Payer: MEDICARE

## 2024-07-30 PROCEDURE — G0299 HHS/HOSPICE OF RN EA 15 MIN: HCPCS | Mod: HHH

## 2024-07-31 VITALS
DIASTOLIC BLOOD PRESSURE: 70 MMHG | HEART RATE: 71 BPM | OXYGEN SATURATION: 98 % | TEMPERATURE: 98.2 F | SYSTOLIC BLOOD PRESSURE: 135 MMHG | RESPIRATION RATE: 18 BRPM

## 2024-07-31 ASSESSMENT — ENCOUNTER SYMPTOMS: DENIES PAIN: 1

## 2024-08-04 ASSESSMENT — ENCOUNTER SYMPTOMS
APPETITE LEVEL: GOOD
LAST BOWEL MOVEMENT: 67051

## 2024-08-08 ENCOUNTER — HOME CARE VISIT (OUTPATIENT)
Dept: HOME HEALTH SERVICES | Facility: HOME HEALTH | Age: 89
End: 2024-08-08
Payer: MEDICARE

## 2024-08-08 VITALS
WEIGHT: 111 LBS | DIASTOLIC BLOOD PRESSURE: 70 MMHG | TEMPERATURE: 98.4 F | RESPIRATION RATE: 16 BRPM | SYSTOLIC BLOOD PRESSURE: 135 MMHG | OXYGEN SATURATION: 98 % | HEART RATE: 80 BPM | BODY MASS INDEX: 20.43 KG/M2 | HEIGHT: 62 IN

## 2024-08-08 PROCEDURE — G0300 HHS/HOSPICE OF LPN EA 15 MIN: HCPCS | Mod: HHH

## 2024-08-10 SDOH — ECONOMIC STABILITY: GENERAL

## 2024-08-10 ASSESSMENT — ENCOUNTER SYMPTOMS
OCCASIONAL FEELINGS OF UNSTEADINESS: 0
DENIES PAIN: 1
LOSS OF SENSATION IN FEET: 0
DEPRESSION: 0
CHANGE IN APPETITE: UNCHANGED
APPETITE LEVEL: GOOD
PERSON REPORTING PAIN: PATIENT

## 2024-08-10 ASSESSMENT — ACTIVITIES OF DAILY LIVING (ADL): MONEY MANAGEMENT (EXPENSES/BILLS): INDEPENDENT

## 2024-08-13 ENCOUNTER — HOME CARE VISIT (OUTPATIENT)
Dept: HOME HEALTH SERVICES | Facility: HOME HEALTH | Age: 89
End: 2024-08-13
Payer: MEDICARE

## 2024-08-13 VITALS
SYSTOLIC BLOOD PRESSURE: 122 MMHG | HEART RATE: 75 BPM | RESPIRATION RATE: 15 BRPM | DIASTOLIC BLOOD PRESSURE: 64 MMHG | OXYGEN SATURATION: 98 % | TEMPERATURE: 98.6 F

## 2024-08-13 PROCEDURE — G0300 HHS/HOSPICE OF LPN EA 15 MIN: HCPCS | Mod: HHH

## 2024-08-13 SDOH — ECONOMIC STABILITY: GENERAL

## 2024-08-13 ASSESSMENT — ENCOUNTER SYMPTOMS
DEPRESSION: 0
APPETITE LEVEL: GOOD
DENIES PAIN: 1
OCCASIONAL FEELINGS OF UNSTEADINESS: 0
LOSS OF SENSATION IN FEET: 0
CHANGE IN APPETITE: UNCHANGED
PERSON REPORTING PAIN: PATIENT

## 2024-08-13 ASSESSMENT — ACTIVITIES OF DAILY LIVING (ADL): MONEY MANAGEMENT (EXPENSES/BILLS): INDEPENDENT

## 2024-08-21 ENCOUNTER — HOME CARE VISIT (OUTPATIENT)
Dept: HOME HEALTH SERVICES | Facility: HOME HEALTH | Age: 89
End: 2024-08-21
Payer: MEDICARE

## 2024-08-21 VITALS
TEMPERATURE: 98.2 F | DIASTOLIC BLOOD PRESSURE: 84 MMHG | SYSTOLIC BLOOD PRESSURE: 138 MMHG | RESPIRATION RATE: 16 BRPM | HEART RATE: 81 BPM | OXYGEN SATURATION: 98 %

## 2024-08-21 PROCEDURE — G0300 HHS/HOSPICE OF LPN EA 15 MIN: HCPCS | Mod: HHH

## 2024-08-24 SDOH — ECONOMIC STABILITY: GENERAL

## 2024-08-24 ASSESSMENT — ENCOUNTER SYMPTOMS
CHANGE IN APPETITE: UNCHANGED
DENIES PAIN: 1
OCCASIONAL FEELINGS OF UNSTEADINESS: 0
DEPRESSION: 0
APPETITE LEVEL: GOOD
LOSS OF SENSATION IN FEET: 0
PERSON REPORTING PAIN: PATIENT

## 2024-08-24 ASSESSMENT — ACTIVITIES OF DAILY LIVING (ADL): MONEY MANAGEMENT (EXPENSES/BILLS): INDEPENDENT

## 2024-08-26 ENCOUNTER — HOME CARE VISIT (OUTPATIENT)
Dept: HOME HEALTH SERVICES | Facility: HOME HEALTH | Age: 89
End: 2024-08-26
Payer: MEDICARE

## 2024-08-26 VITALS
TEMPERATURE: 98.6 F | OXYGEN SATURATION: 96 % | HEART RATE: 78 BPM | SYSTOLIC BLOOD PRESSURE: 130 MMHG | RESPIRATION RATE: 15 BRPM | DIASTOLIC BLOOD PRESSURE: 77 MMHG

## 2024-08-26 PROCEDURE — G0300 HHS/HOSPICE OF LPN EA 15 MIN: HCPCS | Mod: HHH

## 2024-08-30 SDOH — ECONOMIC STABILITY: GENERAL

## 2024-08-30 ASSESSMENT — ENCOUNTER SYMPTOMS
DENIES PAIN: 1
PERSON REPORTING PAIN: PATIENT
CHANGE IN APPETITE: UNCHANGED
OCCASIONAL FEELINGS OF UNSTEADINESS: 0
LOSS OF SENSATION IN FEET: 0
DEPRESSION: 0
APPETITE LEVEL: GOOD

## 2024-08-30 ASSESSMENT — ACTIVITIES OF DAILY LIVING (ADL): MONEY MANAGEMENT (EXPENSES/BILLS): INDEPENDENT

## 2024-09-05 ENCOUNTER — APPOINTMENT (OUTPATIENT)
Dept: NEPHROLOGY | Facility: CLINIC | Age: 89
End: 2024-09-05
Payer: MEDICARE

## 2024-09-09 ENCOUNTER — HOME CARE VISIT (OUTPATIENT)
Dept: HOME HEALTH SERVICES | Facility: HOME HEALTH | Age: 89
End: 2024-09-09
Payer: MEDICARE

## 2024-09-09 ASSESSMENT — ACTIVITIES OF DAILY LIVING (ADL)
OASIS_M1830: 03
HOME_HEALTH_OASIS: 01

## 2024-10-10 DIAGNOSIS — E05.90 HYPERTHYROIDISM: ICD-10-CM

## 2024-10-10 RX ORDER — LEVOTHYROXINE SODIUM 25 UG/1
50 TABLET ORAL DAILY
Qty: 180 TABLET | Refills: 3 | Status: SHIPPED | OUTPATIENT
Start: 2024-10-10 | End: 2025-10-10

## 2024-11-17 NOTE — PROGRESS NOTES
Subjective   Ms. Eric is 89 y.o. year old female and here for f/u of No chief complaint on file.. Here with ***.    Last visit  Per pt discussion/summary:       HPI     Per patient and caregiver (obtained in addition due to ***)    What matters most: ***  Health Goals: ***  Preference: ***    Home environment: ***    Alcohol:  Smoking:    Is dependant or requires assistance in the following BADL:  Is dependant or requires assistance in the following Instrumental ADL:    History of Abuse/Neglect/exploitation: ***    Advanced Directives on file: <no information>  [unfilled]      Medications reviewed and reconciled.     Objective   There were no vitals taken for this visit.    Physical Exam  Constitutional: No Acute Distress; Well Kempt  Eyes: PERRLA, EOMI  Ears: auditory canals clear, TM's +LR b/l  ENT: Pharynx clear, neck supple  Lymphatic: No anterior cervical, supraclavicular adenopathy  Cardiovascular: RRR, +S1, S2, no murmurs appreciated, physiologic JVD.  Extremities: no cyanosis, clubbing, or edema. Pedal pulses intact  Respiratory: clear without rales, rhonchi or wheezes  Gastrointestinal: +BS, soft, nontender  Genitourinary: not examined  Musculoskeletal: unremarkable  Integumentary: skin warm, no tenting, no remarkable lesions  Neurological: non-focal deficit. Get-up-and-go:   Gait: stable  Psychiatric: affect      MoCA:  /30  CDT:    /5  PHQ9:   /27    Lab Results   Component Value Date    TSH 0.01 (L) 02/01/2024    TSH 0.03 (L) 12/11/2023    TSH 0.05 (L) 09/05/2023     Lab Results   Component Value Date    FREET4 1.14 02/01/2024    FREET4 1.19 12/11/2023    FREET4 1.28 09/05/2023     Lab Results   Component Value Date    [id number] 633 03/19/2024    UPZMGNMW17 >2000 (A) 04/20/2021    DPLYATLE00 521 06/22/2018     Lab Results   Component Value Date    HGBA1C 5.4 11/20/2021    HGBA1C 4.1 05/05/2021     Lab Results   Component Value Date    VITD25 37 03/19/2024    VITD25 33 03/14/2023    VITD25 75 05/05/2021         No results found for this or any previous visit from the past 365 days.     CT head wo IV contrast 01/12/2024    Narrative  Interpreted By:  Raysa Webster and Stephens Katherine  STUDY:  CT HEAD WO IV CONTRAST  1/12/2024 8:47 pm    INDICATION:  Signs/Symptoms:hypertensive, syncope    COMPARISON:  CT head 01/21/2022    ACCESSION NUMBER(S):  [id number]    ORDERING CLINICIAN:  BRUNILDA VIDAL    TECHNIQUE:  Serial, axial CT images of the brain were obtained without IV  contrast. Coronal and sagittal reformatted images were performed.    FINDINGS:  The ventricles, cisterns and sulci are prominent, consistent with  diffuse volume loss.  There are areas of nonspecific white matter  hypodensity, which are probably age-related or microvascular in  nature. The gray-white matter differentiation is intact and there is  no evidence of acute territorial infarct.  No mass effect or midline  shift is seen.  There is no hemorrhage.  No extraaxial fluid  collection. No air-fluid levels at the visualized paranasal sinuses.  The mastoid air cells are clear. No depressed calvarial fracture.    Impression  1.  No acute intracranial hemorrhage or acute territorial infarct.        I personally reviewed the images/study and I agree with the findings  as stated.    MACRO:  None.    Signed by: Raysa Webster 1/12/2024 11:01 PM  Dictation workstation:   [id number]     No results found for this or any previous visit from the past 365 days.        Assessment/Plan       Emiliana Hayden MD

## 2024-11-19 ENCOUNTER — APPOINTMENT (OUTPATIENT)
Dept: GERIATRIC MEDICINE | Facility: CLINIC | Age: 89
End: 2024-11-19
Payer: MEDICARE

## 2024-12-09 NOTE — PROGRESS NOTES
"Subjective   Ms. Eric is 89 y.o. year old female and here for medicare annual wellness visit and f/u of ckd, metabolic acidosis, dementia, HTn, weight loss, hyperthyroidism, anemia, insomnia.  Here with dtr Leia.      Last visit 7/16/24  Per pt discussion/summary:   \"You have a follow up visit with kidney doctor in September  2. Make follow up appointment with the thyroid (endocrinologist) doctor for next month if possible  3. Have the thyroid ultrasound done  4. We felipe blood today for the thyroid doctor- to check kidneys, electrolytes, and your thyroid  5. Referral placed to  home care for home physical therapy and nursing (for blood pressure checks)  6. Stop the motrin   7. Start tylenol extra strength  - 2 tabs in the morning  - put them in the pill box with the rest of medicines  - ok to take 2 additional tylenol in the afternoon or evening as needed  8. Use voltaren gel 1% - apply 2 grams to the right shoulder 3 times per day as needed  9. Do the physical therapy exercises for your shoulder  10. Follow up visit in 4 months\"       HPI     Per patient and children  (obtained in addition due to dementia)  - nothing bothering her. No falls   - saw eye doctor yesterday. No changes in medicine  - right shoulder still bothering her. Not taking tylenol daily  - using icy hot. Helps.   - no problems with sleep.   - does nap  - eats and sleeps per son  - bowels moving  - mood ok  - no agitation or combativness or hallucinations  - no dizziness or lightheadedness  - bp high today  - did get medicine this morning per dtr  - son checks bp at home  - recent pressures - 139/65. 123/_, 149/67    Home environment: lives with dtr Asia and son Jonel, big house, no issues ambulating without assistance      Alcohol: no  Smoking: no      Is dependent or requires assistance in the following BADL: dtr helps with bathing, dressing, grooming. no falls   Is dependent or requires assistance in the following " Instrumental ADL: dtr helps with cooking, cleaning. son does meds. he fills pill box. dtr does bills. not driving     History of Abuse/Neglect/exploitation: No     Living Will: Y  Durable Power of  of Health Care: Dtr         Medications reviewed and reconciled.     Objective   /61   Pulse 67   Temp 36.2 °C (97.2 °F) (Tympanic)   Resp 18   Wt 49.6 kg (109 lb 6.4 oz)   BMI 20.01 kg/m²     Physical Exam  Constitutional: No Acute Distress; Well Kempt  Eyes: PERRLA  Ears: wax in both ears, TM's +LR b/l  ENT: Pharynx clear, neck supple  Lymphatic: No anterior cervical, supraclavicular adenopathy  Cardiovascular: RRR, +S1, S2, no murmurs appreciated, physiologic JVD.  Extremities: no cyanosis, clubbing, or edema. Pedal pulses intact  Respiratory: clear without rales, rhonchi or wheezes  Gastrointestinal: +BS, soft, nontender  Genitourinary: not examined  Musculoskeletal: unremarkable  Integumentary: skin warm, no tenting, no remarkable lesions  Neurological: non-focal deficit. Get-up-and-go: not observed   Psychiatric: affect full                 Component  Ref Range & Units 8 mo ago  (3/19/24) 11 mo ago  (1/13/24) 11 mo ago  (1/13/24) 11 mo ago  (1/12/24) 11 mo ago  (1/12/24) 11 mo ago  (1/3/24) 12 mo ago  (12/11/23)   Glucose  74 - 99 mg/dL 105 High  76 84  99 105 High  103 High    Sodium  136 - 145 mmol/L 135 Low  138 134 Low   134 Low  130 Low  137   Potassium  3.5 - 5.3 mmol/L 5.0 4.4 5.5 High  6.7 High Panic  CM 7.0 High Panic  4.4 4.8 CM   Chloride  98 - 107 mmol/L 105 114 High  106  110 High  103 109 High    Bicarbonate  21 - 32 mmol/L 19 Low  14 Low  19 Low   13 Low  18 Low  17 Low    Anion Gap  10 - 20 mmol/L 16 14 15  18 13 16   Urea Nitrogen  6 - 23 mg/dL 21 18 22  27 High  13 15   Creatinine  0.50 - 1.05 mg/dL 1.48 High  1.53 High  1.97 High   2.32 High  1.14 High  1.20 High    eGFR  >60 mL/min/1.73m*2 33 Low  32 Low  CM 24 Low  CM  19 Low  CM 46 Low  CM 43 Low  CM   Comment: Calculations  of estimated GFR are performed using the 2021 CKD-EPI Study Refit equation without the race variable for the IDMS-Traceable creatinine methods.  https://jasn.asnjournals.org/content/early/2021/09/22/ASN.4144013179   Calcium  8.6 - 10.6 mg/dL 10.1 7.8 Low  10.2  9.9 10.3 9.5   Albumin  3.4 - 5.0 g/dL 4.5 3.1 Low  4.2  4.4 4.4 4.1   Alkaline Phosphatase  33 - 136 U/L 94    90 111 109   Total Protein  6.4 - 8.2 g/dL 7.4    7.9 7.5 7.2   AST  9 - 39 U/L 19    22 17 21 CM   Bilirubin, Total  0.0 - 1.2 mg/dL 0.5    0.4 0.4 0.3   ALT  7 - 45 U/L 8             Component  Ref Range & Units 8 mo ago  (3/19/24) 11 mo ago  (1/12/24) 11 mo ago  (1/3/24) 12 mo ago  (12/11/23) 1 yr ago  (9/2/23) 1 yr ago  (9/1/23) 1 yr ago  (3/14/23)   WBC  4.4 - 11.3 x10*3/uL 7.6 6.3 6.7 7.5 6.4 R 8.2 R 9.0 R   Hemoglobin  12.0 - 16.0 g/dL 11.4 Low  13.4 12.4 11.3 Low  12.3 10.8 Low  11.4 Low    Hematocrit  36.0 - 46.0 % 35.3 Low  39.4 35.1 Low  32.3 Low  35.7 Low  28.8 Low  34.8 Low    MCV  80 - 100 fL 89 82 81 82 84 78 Low  88   RDW  11.5 - 14.5 % 14.4 15.9 High  13.7 13.8 15.0 High  14.2 13.2   Platelets  150 - 450 x10*3/uL 398 294 333 318 200 R 391 R 415 R               Component  Ref Range & Units 8 mo ago  (3/19/24) 11 mo ago  (1/13/24) 11 mo ago  (1/13/24) 11 mo ago  (1/12/24) 1 yr ago  (9/5/23) 1 yr ago  (3/14/23) 2 yr ago  (11/14/22)   Phosphorus  2.5 - 4.9 mg/dL 3.9 2.6 CM 2.9 CM 3.3 CM 3.2 CM 2.7 CM 3.8 CM              Component  Ref Range & Units 10 mo ago  (2/1/24) 2 yr ago  (12/15/21) 3 yr ago  (11/19/21) 3 yr ago  (5/5/21) 3 yr ago  (4/20/21) 3 yr ago  (4/19/21)   TSI  <=1.3 TSI index 4.3 High  7.5 High  CM 5.2 High  CM 2.9 High  CM 4.0 High  CM 4.4 High  CM        Component  Ref Range & Units 10 mo ago  (2/1/24) 1 yr ago  (5/30/23) 1 yr ago  (3/14/23) 2 yr ago  (6/28/22) 2 yr ago  (5/16/22) 2 yr ago  (3/29/22) 2 yr ago  (2/11/22)   Triiodothyronine  60 - 200 ng/dL 115 102 131 98 98 163 138       Lab Results   Component Value Date  "   TSH 0.01 (L) 02/01/2024    TSH 0.03 (L) 12/11/2023    TSH 0.05 (L) 09/05/2023     Lab Results   Component Value Date    FREET4 1.14 02/01/2024    FREET4 1.19 12/11/2023    FREET4 1.28 09/05/2023     Lab Results   Component Value Date    QWFLZJFU24 633 03/19/2024    FEVIIXAK14 >2000 (A) 04/20/2021    JNGDQFED64 521 06/22/2018     Lab Results   Component Value Date    HGBA1C 5.4 11/20/2021    HGBA1C 4.1 05/05/2021     Lab Results   Component Value Date    VITD25 37 03/19/2024    VITD25 33 03/14/2023    VITD25 75 05/05/2021      Ultrasound thyroid 7/24/24  \"IMPRESSION:  *New 0.3 cm nodule in the right lower thyroid gland is category 3.  Based on size criteria no follow-up is recommended. *New 0.4 cm  nodule in the left upper thyroid gland is category 3. Based on size  criteria no follow-up is recommended. *There are additional stable  0.6 and 0.4 cm in the left thyroid gland which are TI-RADS 4 and 3,  similar to prior ultrasound 03/18/2022. Based on size criteria,  require no follow-up.\"    CT head wo IV contrast 01/12/2024  FINDINGS:  The ventricles, cisterns and sulci are prominent, consistent with  diffuse volume loss.  There are areas of nonspecific white matter  hypodensity, which are probably age-related or microvascular in  nature. The gray-white matter differentiation is intact and there is  no evidence of acute territorial infarct.  No mass effect or midline  shift is seen.  There is no hemorrhage.  No extraaxial fluid  collection. No air-fluid levels at the visualized paranasal sinuses.  The mastoid air cells are clear. No depressed calvarial fracture  Impression  1.  No acute intracranial hemorrhage or acute territorial infarct.    Assessment/Plan    1. HTN   - BP initially elevated to 171 systolic. Was 144/52 on manual repeat. Family note that bps at home are 130s-140s systolic  - currently on amlodipine 5mg- 1.5 tabs daily.   - will continue this and monitor     2. metabolic acidosis  3. CKD  4. " Hyponatremia/Hyperkalemia  -found to have significant metabolic acidosis and hypokalemia when in hospital in 4/2021. etiology not completely clear. put on sodium bicarbonate 650mg BID. after that hospitalization her kidney function and acidosis improved. CMP in 3/2024 showed k+ 5.0 and sodium 135. last saw nephrology 11/2022. Cancelled appts in 4/2024 and 9/2024. And they just found out today that there is an appt scheduled tomorrow with Dr. Simmons from nephrology. They won't be able to make this appt. Advised them to rescheduled. Will order renal panel and cbc today.       5. Hyperthyroidism/thyroid nodules  - following with endocrine. her thyroid medicines have been gradually adjusted. Currently on levothyroxine 50mcg daily and methimazole 5mg BID after most recent thyroid values done in 2/2024 of TSH 0.01, TSI 4.23, with normal T3 115, and t4 of 1.14.  again advised scheduling a f/up appt with endocrine. Will order TSH, free T4, T3 today.     6. R shoulder adhesive capsulitis   -continues to have some R shoulder pain and cannot extend her arms. Of note, passive range of motion was about the same in both shoulders at last visit.  Son says she is getting tylenol twice daily. And pt did complete home PT after last visit     7. anemia  - hemoglobin had been 8-9 in early 2021. improved since then. Was 11.4 in 3/2024. Will repeat cbc today     8. major neurocognitive disorder due to multiple etiologies   -in past she would say she was on a memory pill but it turned out to just be therems-M- a multivitamin. does have some forgetfulness. scored 10/22 on blind MoCA and 14/22 on blind MMSE in 12/2020.  13/30 on MMSE in 3/2024.  -she is getting help with medicines (from her son) and most IADLs. Son fills pill box but dtr-in-law gives it to her.   - suspect she may need some cues and prompting for BADLs as well. does live with 3 children and has help at home. Memory is stable per pt and family.      9. osteopenia  T -1.6 in  L femoral neck in 2012. alendronate is on past med list from 2012 to 2013. not clear if she took it. family not aware of this. Repeat DeXA 3/2023 showed T -2.5 in total L femur (-2.4 in fem neck). Could potentially benefit from medication. In past, family was not interested in treatment. Will further discuss with pt and family at next visit. Could consider prolia.      10. Health maintenance   - she refuses immunizations including covid and flu  - family thinks she does have HCPOA document. But son mentions they want to change it. Pt today says she would want her oldest dtr (here today) to be the main decision maker.  gave pt a copy of HCPOA and living will.   - wishes to be full code. Will further discuss at next visit  - annual wellness visit subsequent completed today  - mammogram and colonoscopy not indicated.      follow up with Dr. Hayden in 4-6 months     Emiliana Hayden MD

## 2024-12-10 ENCOUNTER — OFFICE VISIT (OUTPATIENT)
Dept: GERIATRIC MEDICINE | Facility: CLINIC | Age: 89
End: 2024-12-10
Payer: MEDICARE

## 2024-12-10 VITALS
HEART RATE: 67 BPM | TEMPERATURE: 97.2 F | BODY MASS INDEX: 20.01 KG/M2 | WEIGHT: 109.4 LBS | RESPIRATION RATE: 18 BRPM | SYSTOLIC BLOOD PRESSURE: 144 MMHG | DIASTOLIC BLOOD PRESSURE: 52 MMHG

## 2024-12-10 DIAGNOSIS — G89.29 CHRONIC RIGHT SHOULDER PAIN: ICD-10-CM

## 2024-12-10 DIAGNOSIS — M25.511 CHRONIC RIGHT SHOULDER PAIN: ICD-10-CM

## 2024-12-10 DIAGNOSIS — N18.31 STAGE 3A CHRONIC KIDNEY DISEASE (MULTI): ICD-10-CM

## 2024-12-10 DIAGNOSIS — M85.80 OSTEOPENIA, UNSPECIFIED LOCATION: ICD-10-CM

## 2024-12-10 DIAGNOSIS — E87.1 HYPONATREMIA: ICD-10-CM

## 2024-12-10 DIAGNOSIS — Z00.00 HEALTH CARE MAINTENANCE: ICD-10-CM

## 2024-12-10 DIAGNOSIS — E87.20 METABOLIC ACIDOSIS: ICD-10-CM

## 2024-12-10 DIAGNOSIS — E05.90 HYPERTHYROIDISM: Primary | ICD-10-CM

## 2024-12-10 DIAGNOSIS — I10 HYPERTENSION, UNSPECIFIED TYPE: ICD-10-CM

## 2024-12-10 DIAGNOSIS — D64.9 ANEMIA OF UNKNOWN ETIOLOGY: ICD-10-CM

## 2024-12-10 DIAGNOSIS — F03.90 MAJOR NEUROCOGNITIVE DISORDER (MULTI): ICD-10-CM

## 2024-12-10 LAB
ERYTHROCYTE [DISTWIDTH] IN BLOOD BY AUTOMATED COUNT: 14.2 % (ref 11.5–14.5)
HCT VFR BLD AUTO: 36.2 % (ref 36–46)
HGB BLD-MCNC: 11.8 G/DL (ref 12–16)
MCH RBC QN AUTO: 28 PG (ref 26–34)
MCHC RBC AUTO-ENTMCNC: 32.6 G/DL (ref 32–36)
MCV RBC AUTO: 86 FL (ref 80–100)
NRBC BLD-RTO: 0 /100 WBCS (ref 0–0)
PLATELET # BLD AUTO: 386 X10*3/UL (ref 150–450)
RBC # BLD AUTO: 4.22 X10*6/UL (ref 4–5.2)
WBC # BLD AUTO: 9.3 X10*3/UL (ref 4.4–11.3)

## 2024-12-10 PROCEDURE — 36415 COLL VENOUS BLD VENIPUNCTURE: CPT | Performed by: INTERNAL MEDICINE

## 2024-12-10 PROCEDURE — 1159F MED LIST DOCD IN RCRD: CPT | Performed by: INTERNAL MEDICINE

## 2024-12-10 PROCEDURE — 3077F SYST BP >= 140 MM HG: CPT | Performed by: INTERNAL MEDICINE

## 2024-12-10 PROCEDURE — 99215 OFFICE O/P EST HI 40 MIN: CPT | Performed by: INTERNAL MEDICINE

## 2024-12-10 PROCEDURE — 84443 ASSAY THYROID STIM HORMONE: CPT | Performed by: INTERNAL MEDICINE

## 2024-12-10 PROCEDURE — 1126F AMNT PAIN NOTED NONE PRSNT: CPT | Performed by: INTERNAL MEDICINE

## 2024-12-10 PROCEDURE — 1170F FXNL STATUS ASSESSED: CPT | Performed by: INTERNAL MEDICINE

## 2024-12-10 PROCEDURE — 80069 RENAL FUNCTION PANEL: CPT | Performed by: INTERNAL MEDICINE

## 2024-12-10 PROCEDURE — 84439 ASSAY OF FREE THYROXINE: CPT | Performed by: INTERNAL MEDICINE

## 2024-12-10 PROCEDURE — G0439 PPPS, SUBSEQ VISIT: HCPCS | Performed by: INTERNAL MEDICINE

## 2024-12-10 PROCEDURE — 85027 COMPLETE CBC AUTOMATED: CPT | Performed by: INTERNAL MEDICINE

## 2024-12-10 PROCEDURE — 84480 ASSAY TRIIODOTHYRONINE (T3): CPT | Performed by: INTERNAL MEDICINE

## 2024-12-10 PROCEDURE — 3078F DIAST BP <80 MM HG: CPT | Performed by: INTERNAL MEDICINE

## 2024-12-10 PROCEDURE — 1160F RVW MEDS BY RX/DR IN RCRD: CPT | Performed by: INTERNAL MEDICINE

## 2024-12-10 ASSESSMENT — ACTIVITIES OF DAILY LIVING (ADL)
MANAGING_FINANCES: NEEDS ASSISTANCE
TAKING_MEDICATION: NEEDS ASSISTANCE
GROCERY_SHOPPING: TOTAL CARE
DRESSING: NEEDS ASSISTANCE
BATHING: NEEDS ASSISTANCE
DOING_HOUSEWORK: TOTAL CARE

## 2024-12-10 ASSESSMENT — PATIENT HEALTH QUESTIONNAIRE - PHQ9
SUM OF ALL RESPONSES TO PHQ9 QUESTIONS 1 AND 2: 0
2. FEELING DOWN, DEPRESSED OR HOPELESS: NOT AT ALL
1. LITTLE INTEREST OR PLEASURE IN DOING THINGS: NOT AT ALL

## 2024-12-10 ASSESSMENT — ENCOUNTER SYMPTOMS
LOSS OF SENSATION IN FEET: 0
OCCASIONAL FEELINGS OF UNSTEADINESS: 0

## 2024-12-10 ASSESSMENT — PAIN SCALES - GENERAL: PAINLEVEL_OUTOF10: 0-NO PAIN

## 2024-12-10 NOTE — PATIENT INSTRUCTIONS
Reschedule tomorrow's appointment with the kidney doctor (Dr. Fernando Simmons)    2. Schedule a follow up with the endocrine (thyroid) doctor- Dr. Langford    3. Follow up visit with Dr. Hayden in 6 months    4. Complete the health care power of  document  - it can be signed in front of a notary or in front of 2 nonfamily witnesses

## 2024-12-11 ENCOUNTER — APPOINTMENT (OUTPATIENT)
Dept: NEPHROLOGY | Facility: CLINIC | Age: 89
End: 2024-12-11
Payer: MEDICARE

## 2024-12-11 DIAGNOSIS — E87.1 HYPONATREMIA: ICD-10-CM

## 2024-12-11 DIAGNOSIS — E87.20 METABOLIC ACIDOSIS: ICD-10-CM

## 2024-12-11 DIAGNOSIS — N18.31 STAGE 3A CHRONIC KIDNEY DISEASE (MULTI): Primary | ICD-10-CM

## 2024-12-11 DIAGNOSIS — E87.5 HYPERKALEMIA: ICD-10-CM

## 2024-12-11 LAB
ALBUMIN SERPL BCP-MCNC: 4.3 G/DL (ref 3.4–5)
ANION GAP SERPL CALC-SCNC: 15 MMOL/L (ref 10–20)
BUN SERPL-MCNC: 32 MG/DL (ref 6–23)
CALCIUM SERPL-MCNC: 9.3 MG/DL (ref 8.6–10.6)
CHLORIDE SERPL-SCNC: 105 MMOL/L (ref 98–107)
CO2 SERPL-SCNC: 17 MMOL/L (ref 21–32)
CREAT SERPL-MCNC: 1.63 MG/DL (ref 0.5–1.05)
EGFRCR SERPLBLD CKD-EPI 2021: 29 ML/MIN/1.73M*2
GLUCOSE SERPL-MCNC: 97 MG/DL (ref 74–99)
PHOSPHATE SERPL-MCNC: 3.8 MG/DL (ref 2.5–4.9)
POTASSIUM SERPL-SCNC: 5.8 MMOL/L (ref 3.5–5.3)
SODIUM SERPL-SCNC: 131 MMOL/L (ref 136–145)
T3 SERPL-MCNC: 97 NG/DL (ref 60–200)
T4 FREE SERPL-MCNC: 1.33 NG/DL (ref 0.78–1.48)
TSH SERPL-ACNC: 0.22 MIU/L (ref 0.44–3.98)

## 2024-12-19 ENCOUNTER — APPOINTMENT (OUTPATIENT)
Dept: NEPHROLOGY | Facility: CLINIC | Age: 89
End: 2024-12-19
Payer: MEDICARE

## 2024-12-19 VITALS — SYSTOLIC BLOOD PRESSURE: 142 MMHG | HEART RATE: 55 BPM | DIASTOLIC BLOOD PRESSURE: 51 MMHG

## 2024-12-19 DIAGNOSIS — N18.30 STAGE 3 CHRONIC KIDNEY DISEASE, UNSPECIFIED WHETHER STAGE 3A OR 3B CKD (MULTI): ICD-10-CM

## 2024-12-19 DIAGNOSIS — E87.29: ICD-10-CM

## 2024-12-19 DIAGNOSIS — I10 BENIGN ESSENTIAL HYPERTENSION: ICD-10-CM

## 2024-12-19 DIAGNOSIS — E87.5 HYPERKALEMIA: Primary | ICD-10-CM

## 2024-12-19 LAB
APPEARANCE UR: CLEAR
APPEARANCE UR: CLEAR
BILIRUB UR STRIP.AUTO-MCNC: NEGATIVE MG/DL
BILIRUB UR STRIP.AUTO-MCNC: NEGATIVE MG/DL
CHLORIDE UR-SCNC: 21 MMOL/L
CHLORIDE UR-SCNC: 21 MMOL/L
CHLORIDE/CREATININE (MMOL/G) IN URINE: 18 MMOL/G CREAT (ref 38–318)
CHLORIDE/CREATININE (MMOL/G) IN URINE: 18 MMOL/G CREAT (ref 38–318)
COLOR UR: YELLOW
COLOR UR: YELLOW
CREAT UR-MCNC: 119.6 MG/DL (ref 20–320)
CREAT UR-MCNC: 119.8 MG/DL (ref 20–320)
GLUCOSE UR STRIP.AUTO-MCNC: NORMAL MG/DL
GLUCOSE UR STRIP.AUTO-MCNC: NORMAL MG/DL
HOLD SPECIMEN: NORMAL
HYALINE CASTS #/AREA URNS AUTO: ABNORMAL /LPF
KETONES UR STRIP.AUTO-MCNC: NEGATIVE MG/DL
KETONES UR STRIP.AUTO-MCNC: NEGATIVE MG/DL
LEUKOCYTE ESTERASE UR QL STRIP.AUTO: ABNORMAL
LEUKOCYTE ESTERASE UR QL STRIP.AUTO: ABNORMAL
MUCOUS THREADS #/AREA URNS AUTO: ABNORMAL /LPF
NITRITE UR QL STRIP.AUTO: NEGATIVE
NITRITE UR QL STRIP.AUTO: NEGATIVE
PH UR STRIP.AUTO: 6 [PH]
PH UR STRIP.AUTO: 6 [PH]
POTASSIUM UR-SCNC: 43 MMOL/L
POTASSIUM UR-SCNC: 43 MMOL/L
POTASSIUM/CREAT UR-RTO: 36 MMOL/G CREAT
POTASSIUM/CREAT UR-RTO: 36 MMOL/G CREAT
PROT UR STRIP.AUTO-MCNC: NEGATIVE MG/DL
PROT UR STRIP.AUTO-MCNC: NEGATIVE MG/DL
RBC # UR STRIP.AUTO: NEGATIVE /UL
RBC # UR STRIP.AUTO: NEGATIVE /UL
RBC #/AREA URNS AUTO: ABNORMAL /HPF
RBC #/AREA URNS AUTO: ABNORMAL /HPF
SODIUM UR-SCNC: 56 MMOL/L
SODIUM UR-SCNC: 56 MMOL/L
SODIUM/CREAT UR-RTO: 47 MMOL/G CREAT
SODIUM/CREAT UR-RTO: 47 MMOL/G CREAT
SP GR UR STRIP.AUTO: 1.02
SP GR UR STRIP.AUTO: 1.02
SQUAMOUS #/AREA URNS AUTO: ABNORMAL /HPF
UROBILINOGEN UR STRIP.AUTO-MCNC: NORMAL MG/DL
UROBILINOGEN UR STRIP.AUTO-MCNC: NORMAL MG/DL
WBC #/AREA URNS AUTO: ABNORMAL /HPF
WBC #/AREA URNS AUTO: ABNORMAL /HPF

## 2024-12-19 PROCEDURE — 99214 OFFICE O/P EST MOD 30 MIN: CPT | Performed by: NURSE PRACTITIONER

## 2024-12-19 PROCEDURE — 3077F SYST BP >= 140 MM HG: CPT | Performed by: NURSE PRACTITIONER

## 2024-12-19 PROCEDURE — 82436 ASSAY OF URINE CHLORIDE: CPT

## 2024-12-19 PROCEDURE — 1159F MED LIST DOCD IN RCRD: CPT | Performed by: NURSE PRACTITIONER

## 2024-12-19 PROCEDURE — 84300 ASSAY OF URINE SODIUM: CPT

## 2024-12-19 PROCEDURE — 1126F AMNT PAIN NOTED NONE PRSNT: CPT | Performed by: NURSE PRACTITIONER

## 2024-12-19 PROCEDURE — 81001 URINALYSIS AUTO W/SCOPE: CPT

## 2024-12-19 PROCEDURE — 1160F RVW MEDS BY RX/DR IN RCRD: CPT | Performed by: NURSE PRACTITIONER

## 2024-12-19 PROCEDURE — 3078F DIAST BP <80 MM HG: CPT | Performed by: NURSE PRACTITIONER

## 2024-12-19 PROCEDURE — 87086 URINE CULTURE/COLONY COUNT: CPT

## 2024-12-19 PROCEDURE — 82570 ASSAY OF URINE CREATININE: CPT

## 2024-12-19 PROCEDURE — 84133 ASSAY OF URINE POTASSIUM: CPT

## 2024-12-19 RX ORDER — SODIUM BICARBONATE 650 MG/1
1300 TABLET ORAL EVERY 12 HOURS
Qty: 360 TABLET | Refills: 3 | Status: SHIPPED | OUTPATIENT
Start: 2024-12-19 | End: 2025-12-19

## 2024-12-19 ASSESSMENT — ENCOUNTER SYMPTOMS
NAUSEA: 0
APPETITE CHANGE: 0
VOMITING: 0
SHORTNESS OF BREATH: 0
DIZZINESS: 0
DIFFICULTY URINATING: 0
ACTIVITY CHANGE: 0
DYSURIA: 0
LIGHT-HEADEDNESS: 0

## 2024-12-19 ASSESSMENT — PAIN SCALES - GENERAL: PAINLEVEL_OUTOF10: 0-NO PAIN

## 2024-12-19 NOTE — PATIENT INSTRUCTIONS
It was nice to meet you Ms Eric  Your blood work showed elevated potassium and low bicarb level  Please increase bicarb tabs to 2tabs twice daily  Please avoid eating foods high in potassium  List provided    You have chronic kidney disease stage III. Avoid over the counter pain medications like Ibuprofen, naproxen, meloxicam, diclofenac, sulindac etc (NSAIDs).Tylenol is OK to use.Avoid IV contrast dye.Low sodium diet. BP goal less than 130/80 mm Hg.Tell all your health care providers you have chronic kidney disease stage III. Check labs in 1 week

## 2024-12-19 NOTE — PROGRESS NOTES
Subjective   Patient ID: Jamila Eric is a 89 y.o. female who presents for Follow-up.  HPI  Pt presents with sons  for hypertensive CKD follow up   Seen by Dr Hahn in 2022   History of metabolic acidosis and hyperkalemia and hypokalemia for years,  Graves Ds, HTN,   Last labs on 12/10 with K+ of 5.8, bicarb 17, Cr 1.63 and eGFR 29ml/min  No new symptoms or c/o   Lives with 1 son daughter.   Last hospital admission in Jan for generalized weakness, UTI prior to admission   JOSÉ MIGUEL and Hyperkalemia resulted which improved with cocktail and IVF   No nsaids     Review of Systems   Constitutional:  Negative for activity change and appetite change.   Respiratory:  Negative for shortness of breath.    Cardiovascular:  Negative for chest pain and leg swelling.   Gastrointestinal:  Negative for nausea and vomiting.   Genitourinary:  Negative for difficulty urinating and dysuria.   Skin: Negative.    Neurological:  Negative for dizziness and light-headedness.       Objective   Physical Exam  HENT:      Mouth/Throat:      Mouth: Mucous membranes are moist.   Cardiovascular:      Rate and Rhythm: Normal rate and regular rhythm.      Heart sounds: Normal heart sounds.   Pulmonary:      Effort: Pulmonary effort is normal.      Breath sounds: Normal breath sounds.   Abdominal:      Palpations: Abdomen is soft.   Musculoskeletal:      Right lower leg: No edema.      Left lower leg: No edema.   Skin:     General: Skin is warm and dry.   Psychiatric:         Behavior: Behavior normal.     /51   Pulse 55     Assessment/Plan   Diagnoses and all orders for this visit:  Stage 3 chronic kidney disease, unspecified whether stage 3a or 3b CKD (Multi)  Hyperkalemia  Fluctuating Cr essentially stable   Recent labs with elevated K+ and metabolic acidosis: add bicarb supplements  Reviewed food lists divided by potassium content. Discussed with family   Repeat labs today to check K+  As expected no symptoms concerning for uremia   Pt to  return in one month  Acidosis, metabolic  Bicarb low 17  Sodium bicarb tabs added.   Benign essential hypertension  BP above goal   No changes today   Continue current regimen       CLOTILDE Brambila-CNP 12/19/24 9:08 AM

## 2024-12-22 LAB — BACTERIA UR CULT: NORMAL

## 2024-12-26 ENCOUNTER — LAB (OUTPATIENT)
Dept: LAB | Facility: LAB | Age: 89
End: 2024-12-26
Payer: MEDICARE

## 2024-12-26 DIAGNOSIS — E05.90 HYPERTHYROIDISM: ICD-10-CM

## 2024-12-26 DIAGNOSIS — E87.1 HYPONATREMIA: ICD-10-CM

## 2024-12-26 DIAGNOSIS — E87.20 METABOLIC ACIDOSIS: ICD-10-CM

## 2024-12-26 DIAGNOSIS — E87.5 HYPERKALEMIA: ICD-10-CM

## 2024-12-26 DIAGNOSIS — N18.31 STAGE 3A CHRONIC KIDNEY DISEASE (MULTI): ICD-10-CM

## 2024-12-26 LAB
ALBUMIN SERPL BCP-MCNC: 3.9 G/DL (ref 3.4–5)
ALP SERPL-CCNC: 89 U/L (ref 33–136)
ALT SERPL W P-5'-P-CCNC: 7 U/L (ref 7–45)
ANION GAP SERPL CALC-SCNC: 13 MMOL/L (ref 10–20)
AST SERPL W P-5'-P-CCNC: 18 U/L (ref 9–39)
BILIRUB SERPL-MCNC: 0.4 MG/DL (ref 0–1.2)
BUN SERPL-MCNC: 13 MG/DL (ref 6–23)
CALCIUM SERPL-MCNC: 9.4 MG/DL (ref 8.6–10.6)
CHLORIDE SERPL-SCNC: 94 MMOL/L (ref 98–107)
CO2 SERPL-SCNC: 23 MMOL/L (ref 21–32)
CREAT SERPL-MCNC: 1.22 MG/DL (ref 0.5–1.05)
EGFRCR SERPLBLD CKD-EPI 2021: 42 ML/MIN/1.73M*2
GLUCOSE SERPL-MCNC: 93 MG/DL (ref 74–99)
PHOSPHATE SERPL-MCNC: 3.3 MG/DL (ref 2.5–4.9)
POTASSIUM SERPL-SCNC: 5 MMOL/L (ref 3.5–5.3)
PROT SERPL-MCNC: 6.9 G/DL (ref 6.4–8.2)
SODIUM SERPL-SCNC: 125 MMOL/L (ref 136–145)
T3 SERPL-MCNC: 83 NG/DL (ref 60–200)
T4 FREE SERPL-MCNC: 1.36 NG/DL (ref 0.78–1.48)
TSH SERPL-ACNC: 0.5 MIU/L (ref 0.44–3.98)

## 2024-12-26 PROCEDURE — 84480 ASSAY TRIIODOTHYRONINE (T3): CPT

## 2024-12-26 PROCEDURE — 80053 COMPREHEN METABOLIC PANEL: CPT

## 2024-12-26 PROCEDURE — 84443 ASSAY THYROID STIM HORMONE: CPT

## 2024-12-26 PROCEDURE — 84439 ASSAY OF FREE THYROXINE: CPT

## 2024-12-26 PROCEDURE — 84100 ASSAY OF PHOSPHORUS: CPT

## 2025-01-23 ENCOUNTER — APPOINTMENT (OUTPATIENT)
Dept: NEPHROLOGY | Facility: CLINIC | Age: OVER 89
End: 2025-01-23
Payer: MEDICARE

## 2025-01-23 VITALS
DIASTOLIC BLOOD PRESSURE: 54 MMHG | HEIGHT: 62 IN | HEART RATE: 62 BPM | SYSTOLIC BLOOD PRESSURE: 150 MMHG | BODY MASS INDEX: 20.01 KG/M2

## 2025-01-23 DIAGNOSIS — N18.32 STAGE 3B CHRONIC KIDNEY DISEASE (MULTI): Primary | ICD-10-CM

## 2025-01-23 DIAGNOSIS — I10 BENIGN ESSENTIAL HYPERTENSION: ICD-10-CM

## 2025-01-23 PROCEDURE — 3078F DIAST BP <80 MM HG: CPT | Performed by: NURSE PRACTITIONER

## 2025-01-23 PROCEDURE — 3075F SYST BP GE 130 - 139MM HG: CPT | Performed by: NURSE PRACTITIONER

## 2025-01-23 PROCEDURE — 1126F AMNT PAIN NOTED NONE PRSNT: CPT | Performed by: NURSE PRACTITIONER

## 2025-01-23 PROCEDURE — 1159F MED LIST DOCD IN RCRD: CPT | Performed by: NURSE PRACTITIONER

## 2025-01-23 PROCEDURE — 99215 OFFICE O/P EST HI 40 MIN: CPT | Performed by: NURSE PRACTITIONER

## 2025-01-23 PROCEDURE — 1160F RVW MEDS BY RX/DR IN RCRD: CPT | Performed by: NURSE PRACTITIONER

## 2025-01-23 RX ORDER — ACETAMINOPHEN 500 MG
1 TABLET ORAL DAILY
Qty: 1 KIT | Refills: 0 | Status: SHIPPED | OUTPATIENT
Start: 2025-01-23 | End: 2025-01-23

## 2025-01-23 RX ORDER — ACETAMINOPHEN 500 MG
1 TABLET ORAL DAILY
Qty: 1 KIT | Refills: 0 | Status: SHIPPED | OUTPATIENT
Start: 2025-01-23

## 2025-01-23 ASSESSMENT — ENCOUNTER SYMPTOMS
ROS SKIN COMMENTS: NO ITCHING
DIZZINESS: 0
APPETITE CHANGE: 0
VOMITING: 0
NAUSEA: 0
DIFFICULTY URINATING: 0
DYSURIA: 0
ACTIVITY CHANGE: 0
LIGHT-HEADEDNESS: 0
PSYCHIATRIC NEGATIVE: 1
SHORTNESS OF BREATH: 0

## 2025-01-23 ASSESSMENT — PAIN SCALES - GENERAL: PAINLEVEL_OUTOF10: 0-NO PAIN

## 2025-01-23 NOTE — PROGRESS NOTES
Subjective   Patient ID: Jamila Eric is a 89 y.o. female who presents for Follow-up (Hyperkalemia, CKD Stage 3 ).  HPI  Pt returns with sons Joaquin and Mook for CKD stage 3 follow up  Last visit on 12/19 which was post discharge follow up for hyperkalemia  Last labs on 12/26 with Cr improved to 1.22 and eGFR 42ml/min  Today no new c/o or symptoms  Reports feeling well   No ED visits or hospital admissions    Review of Systems   Constitutional:  Negative for activity change and appetite change.        Per family no changes in energy level  Spends most of time in bed eating and sleeping.  Has small portable cycler for legs does most days    Respiratory:  Negative for shortness of breath.    Cardiovascular:  Negative for chest pain and leg swelling.        Denies orthopnea or PND   Gastrointestinal:  Negative for nausea and vomiting.   Genitourinary:  Negative for difficulty urinating and dysuria.   Skin:         No itching    Neurological:  Negative for dizziness and light-headedness.   Psychiatric/Behavioral: Negative.         Objective   Physical Exam  Constitutional:       General: She is not in acute distress.     Appearance: She is not ill-appearing.   HENT:      Mouth/Throat:      Mouth: Mucous membranes are moist.   Cardiovascular:      Rate and Rhythm: Normal rate and regular rhythm.      Heart sounds: Normal heart sounds.   Pulmonary:      Effort: Pulmonary effort is normal.      Breath sounds: Normal breath sounds.   Abdominal:      Palpations: Abdomen is soft.      Comments: Nontender    Musculoskeletal:      Right lower leg: No edema.      Left lower leg: No edema.   Skin:     General: Skin is warm and dry.   Neurological:      General: No focal deficit present.      Mental Status: She is alert.      Comments: Oriented to person, place   Confused re year and President   Psychiatric:         Mood and Affect: Mood normal.         Behavior: Behavior normal.       Results  Lab Results   Component Value  "Date    CREATININE 1.22 (H) 12/26/2024    BUN 13 12/26/2024     (L) 12/26/2024    K 5.0 12/26/2024    CL 94 (L) 12/26/2024    CO2 23 12/26/2024      Lab Results   Component Value Date    EGFR 42 (L) 12/26/2024     /54 Comment: sitting with feet on floor/previously put them back up on foot rests  Pulse 62   Ht 1.575 m (5' 2\")   BMI 20.01 kg/m²        Assessment/Plan   Diagnoses and all orders for this visit:  Stage 3b chronic kidney disease (Multi)  Kidney function stable last labs end Dec  K+ and bicarb wnl on that lab  Low potassium diet reinforced   RTC in 4 months  Benign essential hypertension  BP above goal  Per sons they have a cuff at home but request script for new which was written  Will seek assistance of clinical pharmacy for BP management and med titration  Family to check cuff at home today to let me know of readings of current device  Reinforced low sodium food choices   Appears euvolemic on exam   -     Referral to Clinical Pharmacy; Future  -     blood pressure monitor (Blood Pressure Kit) kit; 1 kit once daily.         CLOTILDE Brambila-CNP 01/23/25 8:52 AM   "

## 2025-01-23 NOTE — PATIENT INSTRUCTIONS
So nice to see you Ms Eric  Your blood pressure is above goal today.  Please take her blood pressure when you get home (as directed in a chair with feet on floor, arm level of heart. Sit quietly talking for 5-10 mins then take 3 readings a few minutes apart.  Please call me with those readings.  [phone]  You will talk to [employer] my   When I am out after 1/29 call same number for any needs.  I have written prescription for new cuff but want to see how old is functioning.  When I talk to you later we will decide on plan.  I referred you to clinical pharmacist Pranay Pérez who will work with you by phone to adjust medications and work on blood pressure   Please make an appt for May when I will be back

## 2025-02-26 ENCOUNTER — APPOINTMENT (OUTPATIENT)
Dept: PHARMACY | Facility: HOSPITAL | Age: OVER 89
End: 2025-02-26
Payer: MEDICARE

## 2025-03-12 ENCOUNTER — APPOINTMENT (OUTPATIENT)
Dept: PHARMACY | Facility: HOSPITAL | Age: OVER 89
End: 2025-03-12
Payer: MEDICARE

## 2025-03-12 DIAGNOSIS — N18.32 STAGE 3B CHRONIC KIDNEY DISEASE (MULTI): ICD-10-CM

## 2025-03-12 DIAGNOSIS — I10 BENIGN ESSENTIAL HYPERTENSION: ICD-10-CM

## 2025-03-28 ENCOUNTER — PATIENT OUTREACH (OUTPATIENT)
Dept: CARE COORDINATION | Facility: CLINIC | Age: OVER 89
End: 2025-03-28
Payer: MEDICARE

## 2025-03-28 SDOH — ECONOMIC STABILITY: GENERAL: WOULD YOU LIKE HELP WITH ANY OF THE FOLLOWING NEEDS?: I DONT NEED HELP WITH ANY OF THESE

## 2025-03-28 SDOH — ECONOMIC STABILITY: FOOD INSECURITY
ARE ANY OF YOUR NEEDS URGENT? FOR EXAMPLE, UNCERTAINTY OF WHERE YOU WILL GET YOUR NEXT MEAL OR NOT HAVING THE MEDICATIONS YOU NEED TO TAKE TOMORROW.: NO

## 2025-03-28 NOTE — PROGRESS NOTES
Outreach call to patient to support a smooth transition of care from recent admission.  Spoke with patient, reviewed discharge medications, discharge instructions, assessed social needs, and provided education on importance of follow-up appointment with provider.  Will continue to monitor through transition period.  Spoke with son.

## 2025-04-09 ENCOUNTER — APPOINTMENT (OUTPATIENT)
Dept: PHARMACY | Facility: HOSPITAL | Age: OVER 89
End: 2025-04-09
Payer: MEDICARE

## 2025-04-09 DIAGNOSIS — I10 BENIGN ESSENTIAL HYPERTENSION: ICD-10-CM

## 2025-04-09 DIAGNOSIS — N18.32 STAGE 3B CHRONIC KIDNEY DISEASE (MULTI): ICD-10-CM

## 2025-04-09 NOTE — PROGRESS NOTES
Pharmacist Clinic: Nephrology Management    Tadeo Eric is a 89 y.o. female was referred to Clinical Pharmacy Team for bp management.     Referring Provider: Dacia Asencio APRN-C*    THIS IS A FOLLOW UP PATIENT APPOINTMENT. AT LAST VISIT ON 3/12 WITH PHARMACIST (mary jo).    Appointment was completed by tadeo who was reached at .    REVIEW OF PAST APPNT (IF APPLICABLE):   N/a    Allergies Reviewed? No    Allergies   Allergen Reactions    Bactrim [Sulfamethoxazole-Trimethoprim] Other     Significant hyperkalemia and renal dysfunction       Current Outpatient Medications on File Prior to Visit   Medication Sig Dispense Refill    amLODIPine (Norvasc) 5 mg tablet Take 1.5 tablets (7.5 mg) by mouth once daily. 135 tablet 3    aspirin 81 mg EC tablet Take 1 tablet (81 mg) by mouth once daily. 90 tablet 3    blood pressure monitor (Blood Pressure Kit) kit 1 kit once daily. 1 kit 0    levothyroxine (Synthroid, Levoxyl) 25 mcg tablet Take 2 tablets (50 mcg) by mouth once daily. 180 tablet 3    methIMAzole (Tapazole) 5 mg tablet Take 1 tablet (5 mg) by mouth 2 times a day. 180 tablet 3    multivitamin with minerals (Therems-M) tablet Take 1 tablet by mouth once daily. 90 tablet 3    sodium bicarbonate 650 mg tablet Take 2 tablets (1,300 mg) by mouth every 12 hours. 360 tablet 3     No current facility-administered medications on file prior to visit.       PHARMACEUTICAL ASSESSMENT:    MEDICATION RECONCILIATION    Was a medication reconciliation completed at this visit? Yes  Home Pharmacy Reviewed? Yes, describe: [facility]      Drug Interactions? No    Medication Documentation Review Audit       Reviewed by KRISTEN Brambila (Nurse Practitioner) on 01/23/25 at 0851      Medication Order Taking? Sig Documenting Provider Last Dose Status   amLODIPine (Norvasc) 5 mg tablet [id number] Yes Take 1.5 tablets (7.5 mg) by mouth once daily. Emiliana Hayden MD  Active   aspirin 81 mg EC tablet  [id number] Yes Take 1 tablet (81 mg) by mouth once daily. Emiliana Hayden MD  Active   levothyroxine (Synthroid, Levoxyl) 25 mcg tablet [id number] Yes Take 2 tablets (50 mcg) by mouth once daily. Emiliana Hayden MD  Active   methIMAzole (Tapazole) 5 mg tablet [id number] Yes Take 1 tablet (5 mg) by mouth 2 times a day. Emiliana Hayden MD  Active   multivitamin with minerals (Therems-M) tablet [id number] Yes Take 1 tablet by mouth once daily. Emiliana Hayden MD  Active   sodium bicarbonate 650 mg tablet [id number] Yes Take 2 tablets (1,300 mg) by mouth every 12 hours. CLOTILDE Brambila-CNP  Active                    DISEASE MANAGEMENT ASSESSMENT:     Hypertension History:  HTN diagnosis: yes  Current Regimen  Amlodipine 7.5mg every day    HTN at goal? no  BP Cuff at home? yes  BP Readings from Last 6 Encounters:   01/23/25 150/54   12/19/24 142/51   12/10/24 144/52   08/26/24 130/77   08/21/24 138/84   08/13/24 122/64       Hyperlipidemia History:  Diagnosis? no    Diabetes History:  Diagnosis? no  At goal? yes    Lab Results   Component Value Date    HGBA1C 5.4 11/20/2021    HGBA1C 4.1 05/05/2021       Lab Review  Electrolytes: Within normal limits (stable from last)     Lab Results   Component Value Date    BILITOT 0.4 12/26/2024    CALCIUM 9.4 12/26/2024    CO2 23 12/26/2024    CL 94 (L) 12/26/2024    CREATININE 1.22 (H) 12/26/2024    GLUCOSE 93 12/26/2024    ALKPHOS 89 12/26/2024    K 5.0 12/26/2024    PROT 6.9 12/26/2024     (L) 12/26/2024    AST 18 12/26/2024    ALT 7 12/26/2024    BUN 13 12/26/2024    ANIONGAP 13 12/26/2024    MG 2.18 01/12/2024    PHOS 3.3 12/26/2024    ALBUMIN 3.9 12/26/2024    GFRF 35 (A) 09/05/2023       HISTORY OF PRESENT ILLNESS    CKD ASSESSMENT    Stage: 3b (eGFR 30-44)    Patient followed by nephrology: Yes - [facility] richardson      Last GFR:   Lab Results   Component Value Date    EGFR 42 (L) 12/26/2024    EGFR 29 (L) 12/10/2024    EGFR 33 (L) 03/19/2024       Last Albumin/Creatine  "Ratio: not on file  Date: n/a  No results found for: \"MICROALBCREA\"    CURRENT PHARMACOTHERAPY  ACE-I/ARB? No  SGLT2-I? No  MRA? No  GLP-1 RA? No  Hyperuricemic Agent(s)? No  Hyperlipidemic Agent(s)? No    MEDICATIONS REQUIRING RENAL DOSE ADJUSTMENTS:  No  After review, all medications are dosed appropriately     DISCUSSION/ASSESSMENT:   Discussed:   Home bp readings still not available for review today. Normally checked by son who is unavailable at time of appointment. Patient agreeable to have son write down readings so we can review at next appointment and plan for adjustments.    EDUCATION:   Counseled patient on MOA, expectations, side effects, duration of therapy, contraindications, administration, and monitoring parameters  Recommended maintaining a diet with a protein intake no more than 0.8 g/kg body weight per day and a sodium intake no more than 2 grams per day  For patients with HTN, recommended that we target a systolic blood pressure less than 130 mm Hg, when tolerated.  Answered all patient questions and concerns    Assessment/Plan   Problem List Items Addressed This Visit    None      RECOMMENDATIONS/PLAN    Chronic Kidney Disease is stable with most recent GFR of 42 mL/min/1.73m*2   No changes today  Follow Up: 5/8 @ 940a for bp review    Last Appnt with Referring Provider: 1/23/25  Next Appnt with Referring Provider: 5/15/2025  Clinical Pharmacist follow up: 5/8 at 940a  Type of Encounter: Yamila Pérez PharmD    Verbal consent to manage patient's drug therapy was obtained from the patient . They were informed they may decline to participate or withdraw from participation in pharmacy services at any time.    Continue all meds under the continuation of care with the referring provider and clinical pharmacy team.    "

## 2025-05-08 ENCOUNTER — APPOINTMENT (OUTPATIENT)
Dept: PHARMACY | Facility: HOSPITAL | Age: OVER 89
End: 2025-05-08
Payer: MEDICARE

## 2025-05-15 ENCOUNTER — APPOINTMENT (OUTPATIENT)
Dept: NEPHROLOGY | Facility: CLINIC | Age: OVER 89
End: 2025-05-15
Payer: MEDICARE

## 2025-05-15 VITALS
HEART RATE: 76 BPM | WEIGHT: 109 LBS | DIASTOLIC BLOOD PRESSURE: 73 MMHG | SYSTOLIC BLOOD PRESSURE: 159 MMHG | HEIGHT: 62 IN | BODY MASS INDEX: 20.06 KG/M2

## 2025-05-15 DIAGNOSIS — I10 BENIGN ESSENTIAL HYPERTENSION: ICD-10-CM

## 2025-05-15 DIAGNOSIS — N18.31 STAGE 3A CHRONIC KIDNEY DISEASE (MULTI): Primary | ICD-10-CM

## 2025-05-15 DIAGNOSIS — I10 PRIMARY HYPERTENSION: ICD-10-CM

## 2025-05-15 DIAGNOSIS — N18.32 STAGE 3B CHRONIC KIDNEY DISEASE (MULTI): ICD-10-CM

## 2025-05-15 PROCEDURE — 3078F DIAST BP <80 MM HG: CPT | Performed by: NURSE PRACTITIONER

## 2025-05-15 PROCEDURE — 3077F SYST BP >= 140 MM HG: CPT | Performed by: NURSE PRACTITIONER

## 2025-05-15 PROCEDURE — 1159F MED LIST DOCD IN RCRD: CPT | Performed by: NURSE PRACTITIONER

## 2025-05-15 PROCEDURE — 1126F AMNT PAIN NOTED NONE PRSNT: CPT | Performed by: NURSE PRACTITIONER

## 2025-05-15 PROCEDURE — 99213 OFFICE O/P EST LOW 20 MIN: CPT | Performed by: NURSE PRACTITIONER

## 2025-05-15 PROCEDURE — 1160F RVW MEDS BY RX/DR IN RCRD: CPT | Performed by: NURSE PRACTITIONER

## 2025-05-15 RX ORDER — ACETAMINOPHEN 500 MG
1 TABLET ORAL DAILY
Qty: 1 KIT | Refills: 0 | Status: SHIPPED | OUTPATIENT
Start: 2025-05-15

## 2025-05-15 ASSESSMENT — ENCOUNTER SYMPTOMS
APPETITE CHANGE: 0
DIZZINESS: 0
ACTIVITY CHANGE: 0
DYSURIA: 0
NAUSEA: 0
PSYCHIATRIC NEGATIVE: 1
VOMITING: 0
LIGHT-HEADEDNESS: 0
SHORTNESS OF BREATH: 0

## 2025-05-15 ASSESSMENT — PAIN SCALES - GENERAL: PAINLEVEL_OUTOF10: 0-NO PAIN

## 2025-05-15 NOTE — PROGRESS NOTES
Subjective   Patient ID: Jamila Eric is a 89 y.o. female who presents for Chronic Kidney Disease (Follow up visit for CKD).  HPI  Pt with CKD stage 3 presents for follow up   Last seen 1/23. Labs done in Dec with stable kidney function as listed below   Here today with daughter Asia who she lives with and son Mook  Has been feeling good   Up during day, ambulatory at home though naps often throughout the day   Was in ED in March for shingles. Ordered valtrex and was discharged   Home bp cuff with reading yesterday of 137/62    Review of Systems   Constitutional:  Negative for activity change and appetite change.        Food tastes normal  No metallic taste    Respiratory:  Negative for shortness of breath.    Cardiovascular:  Negative for chest pain and leg swelling.        No DOWD   No orthopnea and PND    Gastrointestinal:  Negative for nausea and vomiting.   Genitourinary:  Negative for dyspareunia and dysuria.   Skin:  Negative for rash.        Itching none   Neurological:  Negative for dizziness and light-headedness.   Psychiatric/Behavioral: Negative.         Objective   Physical Exam  Constitutional:       General: She is not in acute distress.     Appearance: Normal appearance.      Comments: Alert elderly female in wheelchair    HENT:      Mouth/Throat:      Mouth: Mucous membranes are moist.   Cardiovascular:      Rate and Rhythm: Normal rate and regular rhythm.      Heart sounds: Normal heart sounds. No murmur heard.     No friction rub.   Pulmonary:      Effort: Pulmonary effort is normal.      Breath sounds: Normal breath sounds.   Abdominal:      Palpations: Abdomen is soft.      Comments: nontender   Musculoskeletal:         General: Normal range of motion.      Cervical back: Normal range of motion.      Right lower leg: No edema.      Left lower leg: No edema.   Skin:     General: Skin is warm and dry.      Findings: No rash.   Neurological:      General: No focal deficit present.       "Mental Status: She is alert.      Comments: Oriented to person and place    Psychiatric:         Mood and Affect: Mood normal.         Behavior: Behavior normal.         Results  Lab Results   Component Value Date    CREATININE 1.22 (H) 12/26/2024    BUN 13 12/26/2024     (L) 12/26/2024    K 5.0 12/26/2024    CL 94 (L) 12/26/2024    CO2 23 12/26/2024      Lab Results   Component Value Date    EGFR 42 (L) 12/26/2024     Lab Results   Component Value Date    WBC 9.3 12/10/2024    RBC 4.22 12/10/2024    HGB 11.8 (L) 12/10/2024    HCT 36.2 12/10/2024    MCV 86 12/10/2024    MCH 28.0 12/10/2024    RDW 14.2 12/10/2024     12/10/2024      /73 (BP Location: Right arm, Patient Position: Sitting, BP Cuff Size: Adult)   Pulse 76   Ht 1.575 m (5' 2\")   Wt 49.4 kg (109 lb)   BMI 19.94 kg/m²     Assessment/Plan   CKD stage 3b  Likely d/t  HTN  Last labs with stable kidney function  Last U/A neg for proteinuria   Will need repeat labs today  RTC in 6mths  Hypertension  BP above goal today  Yesterday BP well controlled but cuff not verified and may be old  BP cuff script printed again for son to check if insurance will cover (second script)  Difficulty meeting with clinical pharmacy by phone  Will reach out to see if appt can be rescheduled. And assistance with titrating meds   Will take home cuff to next appt with PCP for verification         KRISTEN Brambila 05/15/25 8:21 AM   "

## 2025-05-15 NOTE — PATIENT INSTRUCTIONS
It was nice to see you Ms Eric  Please get labs done today  Please take prescription for BP cuff to pharmacy to see if it is covered  I will ask Pranay to reschedule appt.  Please return in 6mths

## 2025-05-16 DIAGNOSIS — N18.31 STAGE 3A CHRONIC KIDNEY DISEASE (MULTI): ICD-10-CM

## 2025-05-16 DIAGNOSIS — I10 PRIMARY HYPERTENSION: ICD-10-CM

## 2025-05-16 LAB
25(OH)D3+25(OH)D2 SERPL-MCNC: 33 NG/ML (ref 30–100)
ALBUMIN SERPL-MCNC: 4.2 G/DL (ref 3.6–5.1)
BUN SERPL-MCNC: 10 MG/DL (ref 7–25)
BUN/CREAT SERPL: 10 (CALC) (ref 6–22)
CALCIUM SERPL-MCNC: 9.5 MG/DL (ref 8.6–10.4)
CHLORIDE SERPL-SCNC: 106 MMOL/L (ref 98–110)
CO2 SERPL-SCNC: 23 MMOL/L (ref 20–32)
CREAT SERPL-MCNC: 0.97 MG/DL (ref 0.6–0.95)
EGFRCR SERPLBLD CKD-EPI 2021: 55 ML/MIN/1.73M2
GLUCOSE SERPL-MCNC: 97 MG/DL (ref 65–99)
PHOSPHATE SERPL-MCNC: 3.7 MG/DL (ref 2.1–4.3)
POTASSIUM SERPL-SCNC: 3.8 MMOL/L (ref 3.5–5.3)
PTH-INTACT SERPL-MCNC: 127 PG/ML (ref 16–77)
SODIUM SERPL-SCNC: 138 MMOL/L (ref 135–146)

## 2025-05-22 DIAGNOSIS — N18.32 STAGE 3B CHRONIC KIDNEY DISEASE (MULTI): Primary | ICD-10-CM

## 2025-06-08 NOTE — PROGRESS NOTES
"Subjective   Ms. Eric is 89 y.o. year old female and here for f/u of ckd, metabolic acidosis, dementia, HTn, weight loss, hyperthyroidism, anemia, insomnia.  Here with dtr Leia.       Last visit 12/11/25  Per pt discussion/summary:   \"Reschedule tomorrow's appointment with the kidney doctor (Dr. Fernando Simmons)  2. Schedule a follow up with the endocrine (thyroid) doctor- Dr. Langford  3. Follow up visit with Dr. Hayden in 6 months  4. Complete the health care power of  document  - it can be signed in front of a notary or in front of 2 nonfamily witnesses\"    Saw nephrology 1/23/25  \"Diagnoses and all orders for this visit:  Stage 3b chronic kidney disease (Multi)  Kidney function stable last labs end Dec  K+ and bicarb wnl on that lab  Low potassium diet reinforced   RTC in 4 months  Benign essential hypertension  BP above goal  Per sons they have a cuff at home but request script for new which was written  Will seek assistance of clinical pharmacy for BP management and med titration  Family to check cuff at home today to let me know of readings of current device  Reinforced low sodium food choices   Appears euvolemic on exam   -     Referral to Clinical Pharmacy; Future  -     blood pressure monitor (Blood Pressure Kit) kit; 1 kit once daily.\"     ER visit 3/19 for zoster rash  \"Jamila Eric is a 89 year old female with history of hypokalemic metabolic acidosis, HTN, CKD, Graves' disease, and history of urinary retention who presents with a a localized rash present around the T3 dermatome on left back, left breast area and right breast area. She first noticed the rash Monday and describes it as very itchy. No burning or oozing of the rash. Discussion with family on phone states she also saw the rash appear on the right side of her cheek. No fevers, chills, chest pain, shortness of breath. No sick contacts. She has not tried new foods, detergents or perfumes. On chart review, she received 1/2 doses of " "Zoster Vaccine in 1982.   In the ED, patient was hemodynamically stable and in no acute distress. She states she was asymptomatic during onset of rash. High suspicion for Shingles due to rash characterization, patient age, and vaccination status. Discussed with family. Patient is stable for discharge and prescribed Valacyclovir. \"    Seen by clinical pharmacist 4/9  \"Discussed:   Home bp readings still not available for review today. Normally checked by son who is unavailable at time of appointment. Patient agreeable to have son write down readings so we can review at next appointment and plan for adjustments.  EDUCATION:   Counseled patient on MOA, expectations, side effects, duration of therapy, contraindications, administration, and monitoring parameters  Recommended maintaining a diet with a protein intake no more than 0.8 g/kg body weight per day and a sodium intake no more than 2 grams per day  For patients with HTN, recommended that we target a systolic blood pressure less than 130 mm Hg, when tolerated.  Answered all patient questions and concerns\"    Follow up with nephrology 5/15/25  \"CKD stage 3b  Likely d/t  HTN  Last labs with stable kidney function  Last U/A neg for proteinuria   Will need repeat labs today  RTC in 6mths  Hypertension  BP above goal today  Yesterday BP well controlled but cuff not verified and may be old  BP cuff script printed again for son to check if insurance will cover (second script)  Difficulty meeting with clinical pharmacy by phone  Will reach out to see if appt can be rescheduled. And assistance with titrating meds   Will take home cuff to next appt with PCP for verification\"    HPI     Per patient and 2 sons and dtr (obtained in addition due to dementia)  - 137/65 yesterday, 131/55 on 6/7  - did take medicine today  - pt denies pain  - no sob, CP. No dizziness or lightheadedness  - bowels moving  - no trouble with sleep     Home environment: lives with dtr Asia and son " Mikael and edwin, big house, no issues ambulating without assistance      Alcohol: no  Smoking: no      Is dependent or requires assistance in the following BADL: dtr helps with bathing, dressing, grooming. no falls   Is dependent or requires assistance in the following Instrumental ADL: dtr helps with cooking, cleaning. son does meds. he fills pill box. dtr does bills. not driving     History of Abuse/Neglect/exploitation: No     Living Will: Y  Durable Power of  of Health Care: Dtr    Medications reviewed and reconciled.     Objective   /68 (BP Location: Right arm, Patient Position: Sitting, BP Cuff Size: Adult) Comment: 176/56 left arm  Pulse 55 Comment: 97%  Temp 36.6 °C (97.9 °F) (Tympanic)   Resp 20   Wt 51.4 kg (113 lb 6.4 oz)   BMI 20.74 kg/m²   160/56 on manual recheck    Physical Exam  Constitutional: No Acute Distress; Well Kempt  Eyes: PERRLA in L eye. Opacified R eye.   Ears: auditory canals clear, TM's +LR b/l  ENT: Pharynx clear, neck supple  Lymphatic: No anterior cervical, supraclavicular adenopathy  Cardiovascular: RRR, +S1, S2, no murmurs appreciated, physiologic JVD.  Extremities: no cyanosis, clubbing, or edema. Pedal pulses intact  Respiratory: clear without rales, rhonchi or wheezes  Gastrointestinal: +BS, soft, nontender  Genitourinary: not examined  Musculoskeletal: some sarcopenia and kyphosis of spine.   Integumentary: skin warm, no tenting, no remarkable lesions  Neurological: non-focal deficit. Get-up-and-go:  not observed  Psychiatric: affect full         Component  Ref Range & Units (hover) 3 wk ago   PARATHYROID HORMONE, INTACT 127 High         Component  Ref Range & Units (hover) 3 wk ago   GLUCOSE 97   Comment:               Fasting reference interval      UREA NITROGEN (BUN) 10   CREATININE 0.97 High    EGFR 55 Low    BUN/CREATININE RATIO 10   SODIUM 138   POTASSIUM 3.8   CHLORIDE 106   CARBON DIOXIDE 23   CALCIUM 9.5   PHOSPHATE (AS PHOSPHORUS) 3.7    ALBUMIN 4.2               Component  Ref Range & Units (hover) 5 mo ago  (12/26/24) 6 mo ago  (12/10/24) 1 yr ago  (2/1/24) 1 yr ago  (12/11/23) 1 yr ago  (9/5/23) 1 yr ago  (9/1/23) 2 yr ago  (5/30/23)   Thyroid Stimulating Hormone 0.50 0.22 Low  0.01 Low  0.03 Low  0.05 Low  CM 0.06 Low  CM 5.35                  Component  Ref Range & Units (hover) 5 mo ago  (12/26/24) 6 mo ago  (12/10/24) 1 yr ago  (2/1/24) 2 yr ago  (5/30/23) 2 yr ago  (3/14/23) 2 yr ago  (6/28/22) 3 yr ago  (5/16/22)   Triiodothyronine 83 97 115 102 131 98 98        Component  Ref Range & Units (hover) 5 mo ago  (12/26/24) 6 mo ago  (12/10/24) 1 yr ago  (2/1/24) 1 yr ago  (12/11/23) 1 yr ago  (9/5/23) 1 yr ago  (9/1/23) 2 yr ago  (5/30/23)   Thyroxine, Free 1.36 1.33 1.14 1.19 1.28 CM 1.44 CM 0.88 CM     Component  Ref Range & Units (hover) 5 mo ago  (12/26/24) 6 mo ago  (12/10/24) 1 yr ago  (3/19/24) 1 yr ago  (1/13/24) 1 yr ago  (1/13/24) 1 yr ago  (1/12/24) 1 yr ago  (1/12/24)   Glucose 93 97 105 High  76 84  99   Sodium 125 Low  131 Low  135 Low  138 134 Low   134 Low    Potassium 5.0 5.8 High  5.0 4.4 5.5 High  6.7 High Panic  CM 7.0 High Panic    Chloride 94 Low  105 105 114 High  106  110 High    Bicarbonate 23 17 Low  19 Low  14 Low  19 Low   13 Low    Anion Gap 13 15 16 14 15  18   Urea Nitrogen 13 32 High  21 18 22  27 High    Creatinine 1.22 High  1.63 High  1.48 High  1.53 High  1.97 High   2.32 High    eGFR 42 Low  29 Low  CM 33 Low  CM 32 Low  CM 24 Low  CM  19 Low  CM   Comment: Calculations of estimated GFR are performed using the 2021 CKD-EPI Study Refit equation without the race variable for the IDMS-Traceable creatinine methods.  https://jasn.asnjournals.org/content/early/2021/09/22/ASN.2446940336   Calcium 9.4 9.3 10.1 7.8 Low  10.2  9.9   Albumin 3.9 4.3 4.5 3.1 Low  4.2  4.4   Alkaline Phosphatase 89  94    90   Total Protein 6.9  7.4    7.9   AST 18  19    22   Bilirubin, Total 0.4  0.5    0.4   ALT 7  8 CM    10 CM      Lab Results   Component Value Date    TSH 0.50 12/26/2024    TSH 0.22 (L) 12/10/2024    TSH 0.01 (L) 02/01/2024     Lab Results   Component Value Date    FREET4 1.36 12/26/2024    FREET4 1.33 12/10/2024    FREET4 1.14 02/01/2024     Lab Results   Component Value Date    GMNBFLNP03 633 03/19/2024    FVCNQFRT91 >2000 (A) 04/20/2021    YOELAUAP71 521 06/22/2018     Lab Results   Component Value Date    HGBA1C 5.4 11/20/2021    HGBA1C 4.1 05/05/2021     Lab Results   Component Value Date    VITD25 33 05/15/2025    VITD25 37 03/19/2024    VITD25 33 03/14/2023        Assessment/Plan   1. HTN   - BP initially elevated to 160s-170s/50s-60 in office today. Though son states that he did get a new BP monitor and pt's bps at home have been in 130s systolic.   - currently on amlodipine 5mg- 1.5 tabs (7.5mg) daily.    - has been following with clinical pharmacy for BP management, though has missed multiple schedule phone call visits. Son notes he has an appt scheduled with pharmacist on 6/16 but unable to make the 3pm time. Gave him scheduling number to try to reschedule. Continue current dose of amlodipine and monitoring     2. metabolic acidosis  3. CKD  4. Hyperparathyroidism  -found to have significant metabolic acidosis and hypokalemia when in hospital in 4/2021. etiology not completely clear. put on sodium bicarbonate 650mg BID. after that hospitalization her kidney function and acidosis improved. CMP in 3/2024 showed k+ 5.0 and sodium 135. Then was lost to follow up with nephrology from 11/2022 until 12/2024. Sodium bicarb was increased to 2 tabs BID in 12/2024. Most recent blood work on 5/16 showed normal bicarb level, sodium and potassium, and stable kidney function at ckd stage 3b. PTH elevated at 127 but calcium level normal. Primary versus secondary hyperparathyroidism. Has next follow up with nephrology in 11/2025.      5. Hyperthyroidism/thyroid nodules  - was following with endocrine. her thyroid medicines have  been gradually adjusted. Last visit had been in 2/2024. Currently on levothyroxine 50mcg daily and methimazole 5mg BID.  TSH on 12/26/2024 was 0.50. previously had been low at 0.01 in 2/1/24 and 0.22 on 12/10/24. Free T4 high normal. Will repeat TFTs today. And advised rescheduling with endocrinologist.      6. R shoulder adhesive capsulitis   -- pt completed home PT last year. Did not mention concerns about her shoulder today     7. anemia  - hemoglobin had been 8-9 in early 2021. improved since then. Was 11.4 in 3/2024 and 12/2024. Will repeat cbc with next blood work.      8. major neurocognitive disorder due to multiple etiologies   -in past she would say she was on a memory pill but it turned out to just be therems-M- a multivitamin. does have some forgetfulness. scored 10/22 on blind MoCA and 14/22 on blind MMSE in 12/2020.  13/30 on MMSE in 3/2024.  -she is getting help with medicines (from her son) and most IADLs. Son fills pill box but dtr-in-law gives it to her.   - suspect she may need some cues and prompting for BADLs as well. does live with 3 children and has help at home. Family didn't mention any concerns about pt's memory or any behaviors. Will monitor     9. osteopenia  T -1.6 in L femoral neck in 2012. alendronate is on past med list from 2012 to 2013. not clear if she took it. family not aware of this. Repeat DeXA 3/2023 showed T -2.5 in total L femur (-2.4 in fem neck). Could potentially benefit from medication. In past, family was not interested in treatment. Will further discuss with pt and family at next visit. Could consider prolia.      10. Health maintenance   - she refuses immunizations including covid and flu  - at last visit, son noted that they want to change pt's HCPOA document. Pt had mentioned that she would want her oldest dtr to be the main decision maker.  gave pt a copy of HCPOA and living will at that time. Did not discuss this with them again today. Will address at next  visit  - at last visit, stated she wanted to be full code. Will further discuss at next visit  - annual wellness visit subsequent completed today    11. Ceruminosis  - had some hard wax in both ears. Was not able to remove with curette. Advised using debrox drops as needed at home.     follow up with Dr. Hayden in 6 months     Emiliana Hayden MD

## 2025-06-10 ENCOUNTER — OFFICE VISIT (OUTPATIENT)
Dept: GERIATRIC MEDICINE | Facility: CLINIC | Age: OVER 89
End: 2025-06-10
Payer: MEDICARE

## 2025-06-10 VITALS
SYSTOLIC BLOOD PRESSURE: 160 MMHG | TEMPERATURE: 97.9 F | RESPIRATION RATE: 20 BRPM | DIASTOLIC BLOOD PRESSURE: 56 MMHG | HEART RATE: 55 BPM | WEIGHT: 113.4 LBS | BODY MASS INDEX: 20.74 KG/M2

## 2025-06-10 DIAGNOSIS — E11.22 CHRONIC KIDNEY DISEASE DUE TO DIABETES MELLITUS (MULTI): ICD-10-CM

## 2025-06-10 DIAGNOSIS — N18.31 STAGE 3A CHRONIC KIDNEY DISEASE (MULTI): ICD-10-CM

## 2025-06-10 DIAGNOSIS — G89.29 CHRONIC RIGHT SHOULDER PAIN: ICD-10-CM

## 2025-06-10 DIAGNOSIS — I10 ESSENTIAL (PRIMARY) HYPERTENSION: ICD-10-CM

## 2025-06-10 DIAGNOSIS — M25.511 CHRONIC RIGHT SHOULDER PAIN: ICD-10-CM

## 2025-06-10 DIAGNOSIS — E21.3 HYPERPARATHYROIDISM (MULTI): ICD-10-CM

## 2025-06-10 DIAGNOSIS — Z00.00 HEALTH CARE MAINTENANCE: ICD-10-CM

## 2025-06-10 DIAGNOSIS — E05.90 HYPERTHYROIDISM: ICD-10-CM

## 2025-06-10 DIAGNOSIS — I10 HYPERTENSION, UNSPECIFIED TYPE: Primary | ICD-10-CM

## 2025-06-10 DIAGNOSIS — E87.20 METABOLIC ACIDOSIS: ICD-10-CM

## 2025-06-10 DIAGNOSIS — E87.1 HYPONATREMIA: ICD-10-CM

## 2025-06-10 DIAGNOSIS — F03.90 MAJOR NEUROCOGNITIVE DISORDER (MULTI): ICD-10-CM

## 2025-06-10 DIAGNOSIS — M85.80 OSTEOPENIA, UNSPECIFIED LOCATION: ICD-10-CM

## 2025-06-10 DIAGNOSIS — D64.9 ANEMIA OF UNKNOWN ETIOLOGY: ICD-10-CM

## 2025-06-10 PROCEDURE — 3078F DIAST BP <80 MM HG: CPT | Performed by: INTERNAL MEDICINE

## 2025-06-10 PROCEDURE — 1126F AMNT PAIN NOTED NONE PRSNT: CPT | Performed by: INTERNAL MEDICINE

## 2025-06-10 PROCEDURE — 1159F MED LIST DOCD IN RCRD: CPT | Performed by: INTERNAL MEDICINE

## 2025-06-10 PROCEDURE — 1036F TOBACCO NON-USER: CPT | Performed by: INTERNAL MEDICINE

## 2025-06-10 PROCEDURE — 99215 OFFICE O/P EST HI 40 MIN: CPT | Performed by: INTERNAL MEDICINE

## 2025-06-10 PROCEDURE — 3077F SYST BP >= 140 MM HG: CPT | Performed by: INTERNAL MEDICINE

## 2025-06-10 RX ORDER — METHIMAZOLE 5 MG/1
5 TABLET ORAL 2 TIMES DAILY
Qty: 180 TABLET | Refills: 3 | Status: SHIPPED | OUTPATIENT
Start: 2025-06-10

## 2025-06-10 RX ORDER — AMLODIPINE BESYLATE 5 MG/1
7.5 TABLET ORAL DAILY
Qty: 135 TABLET | Refills: 3 | Status: SHIPPED | OUTPATIENT
Start: 2025-06-10

## 2025-06-10 ASSESSMENT — PAIN SCALES - GENERAL: PAINLEVEL_OUTOF10: 0-NO PAIN

## 2025-06-10 ASSESSMENT — ENCOUNTER SYMPTOMS
LOSS OF SENSATION IN FEET: 0
DEPRESSION: 0
OCCASIONAL FEELINGS OF UNSTEADINESS: 0

## 2025-06-10 NOTE — PATIENT INSTRUCTIONS
Schedule with endocrinologist- Dr. Gregorio Langford    2. The appointment with the pharmacist for blood pressure management  - is June 16th at 3pm  - if you need to change time, call [phone]     3. For ear wax  - get over the counter wax drops  - 4 to 5 drops in each ear, twice daily for 2-3 days    4. Recommend covid booster, flu vaccine, and RSV vaccine at local pharmacy    5. Follow up in 6 months    6. Blood draw today to check thyroid

## 2025-06-16 ENCOUNTER — APPOINTMENT (OUTPATIENT)
Dept: PHARMACY | Facility: HOSPITAL | Age: OVER 89
End: 2025-06-16
Payer: MEDICARE

## 2025-06-24 ENCOUNTER — APPOINTMENT (OUTPATIENT)
Dept: PHARMACY | Facility: HOSPITAL | Age: OVER 89
End: 2025-06-24
Payer: MEDICARE

## 2025-06-24 DIAGNOSIS — N18.32 STAGE 3B CHRONIC KIDNEY DISEASE (MULTI): ICD-10-CM

## 2025-06-24 DIAGNOSIS — E05.90 HYPERTHYROIDISM: ICD-10-CM

## 2025-06-24 DIAGNOSIS — I10 ESSENTIAL (PRIMARY) HYPERTENSION: ICD-10-CM

## 2025-06-24 PROCEDURE — RXMED WILLOW AMBULATORY MEDICATION CHARGE

## 2025-06-24 RX ORDER — METHIMAZOLE 5 MG/1
5 TABLET ORAL 2 TIMES DAILY
Qty: 180 TABLET | Refills: 3 | Status: SHIPPED | OUTPATIENT
Start: 2025-06-24

## 2025-06-24 RX ORDER — AMLODIPINE BESYLATE 5 MG/1
10 TABLET ORAL DAILY
Qty: 135 TABLET | Refills: 3 | Status: CANCELLED | OUTPATIENT
Start: 2025-06-24

## 2025-06-24 RX ORDER — AMLODIPINE BESYLATE 5 MG/1
10 TABLET ORAL DAILY
Qty: 180 TABLET | Refills: 3 | Status: SHIPPED | OUTPATIENT
Start: 2025-06-24

## 2025-06-24 NOTE — PROGRESS NOTES
Pharmacist Clinic: Nephrology Management    Jamila Eric is a 89 y.o. female was referred to Clinical Pharmacy Team for bp management.     Referring Provider: Dacia Asencio APRN-C*    THIS IS A FOLLOW UP PATIENT APPOINTMENT. AT LAST VISIT ON 3/12 WITH PHARMACIST (mary jo).    Appointment was completed by Joaquin who was reached at .    REVIEW OF PAST APPNT (IF APPLICABLE):   N/a    Allergies Reviewed? No    Allergies   Allergen Reactions    Bactrim [Sulfamethoxazole-Trimethoprim] Other     Significant hyperkalemia and renal dysfunction       Current Outpatient Medications on File Prior to Visit   Medication Sig Dispense Refill    amLODIPine (Norvasc) 5 mg tablet Take 1.5 tablets (7.5 mg) by mouth once daily. 135 tablet 3    aspirin 81 mg EC tablet Take 1 tablet (81 mg) by mouth once daily. 90 tablet 3    blood pressure monitor (Blood Pressure Kit) kit 1 kit once daily. 1 kit 0    levothyroxine (Synthroid, Levoxyl) 25 mcg tablet Take 2 tablets (50 mcg) by mouth once daily. 180 tablet 3    methIMAzole (Tapazole) 5 mg tablet Take 1 tablet (5 mg) by mouth 2 times a day. 180 tablet 3    multivitamin with minerals (Therems-M) tablet Take 1 tablet by mouth once daily. 90 tablet 3    sodium bicarbonate 650 mg tablet Take 2 tablets (1,300 mg) by mouth every 12 hours. 360 tablet 3     No current facility-administered medications on file prior to visit.       PHARMACEUTICAL ASSESSMENT:    MEDICATION RECONCILIATION    Was a medication reconciliation completed at this visit? Yes  Home Pharmacy Reviewed? Yes, describe: [facility]      Drug Interactions? No    Medication Documentation Review Audit       Reviewed by Alonso Dwyer on 06/10/25 at 0849      Medication Order Taking? Sig Documenting Provider Last Dose Status   amLODIPine (Norvasc) 5 mg tablet [id number] Yes Take 1.5 tablets (7.5 mg) by mouth once daily. Emiliana Hayden MD  Active   aspirin 81 mg EC tablet [id number] Yes Take 1 tablet (81 mg)  by mouth once daily. Emiliana Hayden MD  Active   blood pressure monitor (Blood Pressure Kit) kit [id number] Yes 1 kit once daily. KRISTEN Brambila  Active   levothyroxine (Synthroid, Levoxyl) 25 mcg tablet [id number] Yes Take 2 tablets (50 mcg) by mouth once daily. Emiliana Hayden MD  Active   methIMAzole (Tapazole) 5 mg tablet [id number] Yes Take 1 tablet (5 mg) by mouth 2 times a day. Emiliana Hayden MD  Active   multivitamin with minerals (Therems-M) tablet [id number] Yes Take 1 tablet by mouth once daily. Emiliana Hayden MD  Active   sodium bicarbonate 650 mg tablet [id number] Yes Take 2 tablets (1,300 mg) by mouth every 12 hours. KRISTEN Brambila  Active                    DISEASE MANAGEMENT ASSESSMENT:     Hypertension History:  HTN diagnosis: yes  Current Regimen  Amlodipine 7.5mg every day  HTN at goal? no  BP Cuff at home? yes  BP Readings from Last 6 Encounters:   06/10/25 160/56   05/15/25 159/73   01/23/25 150/54   12/19/24 142/51   12/10/24 144/52   08/26/24 130/77   136/56  122/51  119/57  140/64  133/66  124/57  140/65  136/65  133/70  124/62  137/62  132/57  140/62  121/53  140/51        Hyperlipidemia History:  Diagnosis? no    Diabetes History:  Diagnosis? no  At goal? yes    Lab Results   Component Value Date    HGBA1C 5.4 11/20/2021    HGBA1C 4.1 05/05/2021       Lab Review  Electrolytes: Within normal limits (stable from last)     Lab Results   Component Value Date    BILITOT 0.4 12/26/2024    CALCIUM 9.5 05/15/2025    CO2 23 05/15/2025     05/15/2025    CREATININE 0.97 (H) 05/15/2025    GLUCOSE 97 05/15/2025    ALKPHOS 89 12/26/2024    K 3.8 05/15/2025    PROT 6.9 12/26/2024     05/15/2025    AST 18 12/26/2024    ALT 7 12/26/2024    BUN 10 05/15/2025    ANIONGAP 13 12/26/2024    MG 2.18 01/12/2024    PHOS 3.7 05/15/2025    ALBUMIN 4.2 05/15/2025    GFRF 35 (A) 09/05/2023       HISTORY OF PRESENT ILLNESS    CKD ASSESSMENT    Stage: 3b (eGFR 30-44)    Patient followed  "by nephrology: Yes - flores richardson      Last GFR:   Lab Results   Component Value Date    EGFR 55 (L) 05/15/2025    EGFR 42 (L) 12/26/2024    EGFR 29 (L) 12/10/2024       Last Albumin/Creatine Ratio: not on file  Date: n/a  No results found for: \"MICROALBCREA\"    CURRENT PHARMACOTHERAPY  ACE-I/ARB? No  SGLT2-I? No  MRA? No  GLP-1 RA? No  Hyperuricemic Agent(s)? No  Hyperlipidemic Agent(s)? No    MEDICATIONS REQUIRING RENAL DOSE ADJUSTMENTS:  No  After review, all medications are dosed appropriately     DISCUSSION/ASSESSMENT:   Discussed:   Home bp readings still not available for review today. Normally checked by son who is unavailable at time of appointment. Patient agreeable to have son write down readings so we can review at next appointment and plan for adjustments.    EDUCATION:   Counseled patient on MOA, expectations, side effects, duration of therapy, contraindications, administration, and monitoring parameters  Recommended maintaining a diet with a protein intake no more than 0.8 g/kg body weight per day and a sodium intake no more than 2 grams per day  For patients with HTN, recommended that we target a systolic blood pressure less than 130 mm Hg, when tolerated.  Answered all patient questions and concerns    Assessment/Plan   Problem List Items Addressed This Visit    None      RECOMMENDATIONS/PLAN    Home readings have been elevated increase amlodipine to 10mg qd  Follow Up: 5/8 @ 940a for bp review    Last Appnt with Referring Provider: 1/23/25  Next Appnt with Referring Provider: 5/15/2025  Clinical Pharmacist follow up: 7/22 at 2p  Type of Encounter: Yamila Pérez PharmD    Verbal consent to manage patient's drug therapy was obtained from the patient . They were informed they may decline to participate or withdraw from participation in pharmacy services at any time.    Continue all meds under the continuation of care with the referring provider and clinical pharmacy team.    "

## 2025-06-25 ENCOUNTER — PHARMACY VISIT (OUTPATIENT)
Dept: PHARMACY | Facility: CLINIC | Age: OVER 89
End: 2025-06-25
Payer: MEDICARE

## 2025-07-01 DIAGNOSIS — I10 ESSENTIAL (PRIMARY) HYPERTENSION: ICD-10-CM

## 2025-07-01 DIAGNOSIS — N18.30 STAGE 3 CHRONIC KIDNEY DISEASE, UNSPECIFIED WHETHER STAGE 3A OR 3B CKD (MULTI): ICD-10-CM

## 2025-07-01 RX ORDER — ASPIRIN 81 MG/1
81 TABLET ORAL DAILY
Qty: 90 TABLET | Refills: 3 | Status: SHIPPED | OUTPATIENT
Start: 2025-07-01

## 2025-07-14 DIAGNOSIS — I10 BENIGN ESSENTIAL HYPERTENSION: ICD-10-CM

## 2025-07-14 RX ORDER — GLUCOSA SU 2KCL/CHONDROITIN SU 500-400 MG
1 CAPSULE ORAL DAILY
Qty: 90 TABLET | Refills: 3 | Status: SHIPPED | OUTPATIENT
Start: 2025-07-14

## 2025-07-16 ENCOUNTER — APPOINTMENT (OUTPATIENT)
Dept: RADIOLOGY | Facility: HOSPITAL | Age: OVER 89
End: 2025-07-16
Payer: MEDICARE

## 2025-07-16 ENCOUNTER — HOSPITAL ENCOUNTER (EMERGENCY)
Facility: HOSPITAL | Age: OVER 89
Discharge: HOME | End: 2025-07-17
Attending: EMERGENCY MEDICINE
Payer: MEDICARE

## 2025-07-16 DIAGNOSIS — R41.82 ALTERED MENTAL STATUS, UNSPECIFIED ALTERED MENTAL STATUS TYPE: Primary | ICD-10-CM

## 2025-07-16 LAB
ALBUMIN SERPL BCP-MCNC: 3.9 G/DL (ref 3.4–5)
ALP SERPL-CCNC: 61 U/L (ref 33–136)
ALT SERPL W P-5'-P-CCNC: 18 U/L (ref 7–45)
ANION GAP SERPL CALC-SCNC: 15 MMOL/L (ref 10–20)
AST SERPL W P-5'-P-CCNC: 31 U/L (ref 9–39)
BASOPHILS # BLD AUTO: 0.04 X10*3/UL (ref 0–0.1)
BASOPHILS NFR BLD AUTO: 0.4 %
BILIRUB SERPL-MCNC: 0.6 MG/DL (ref 0–1.2)
BUN SERPL-MCNC: 22 MG/DL (ref 6–23)
CALCIUM SERPL-MCNC: 8.9 MG/DL (ref 8.6–10.6)
CARDIAC TROPONIN I PNL SERPL HS: 24 NG/L (ref 0–34)
CHLORIDE SERPL-SCNC: 109 MMOL/L (ref 98–107)
CO2 SERPL-SCNC: 25 MMOL/L (ref 21–32)
CREAT SERPL-MCNC: 1.44 MG/DL (ref 0.5–1.05)
EGFRCR SERPLBLD CKD-EPI 2021: 34 ML/MIN/1.73M*2
EOSINOPHIL # BLD AUTO: 0.22 X10*3/UL (ref 0–0.4)
EOSINOPHIL NFR BLD AUTO: 2.4 %
ERYTHROCYTE [DISTWIDTH] IN BLOOD BY AUTOMATED COUNT: 15.4 % (ref 11.5–14.5)
GLUCOSE BLD MANUAL STRIP-MCNC: 110 MG/DL (ref 74–99)
GLUCOSE SERPL-MCNC: 105 MG/DL (ref 74–99)
HCT VFR BLD AUTO: 34.8 % (ref 36–46)
HGB BLD-MCNC: 11.5 G/DL (ref 12–16)
IMM GRANULOCYTES # BLD AUTO: 0.04 X10*3/UL (ref 0–0.5)
IMM GRANULOCYTES NFR BLD AUTO: 0.4 % (ref 0–0.9)
LYMPHOCYTES # BLD AUTO: 2.82 X10*3/UL (ref 0.8–3)
LYMPHOCYTES NFR BLD AUTO: 31.2 %
MCH RBC QN AUTO: 29.3 PG (ref 26–34)
MCHC RBC AUTO-ENTMCNC: 33 G/DL (ref 32–36)
MCV RBC AUTO: 89 FL (ref 80–100)
MONOCYTES # BLD AUTO: 0.57 X10*3/UL (ref 0.05–0.8)
MONOCYTES NFR BLD AUTO: 6.3 %
NEUTROPHILS # BLD AUTO: 5.34 X10*3/UL (ref 1.6–5.5)
NEUTROPHILS NFR BLD AUTO: 59.3 %
NRBC BLD-RTO: 0 /100 WBCS (ref 0–0)
PHOSPHATE SERPL-MCNC: 3.2 MG/DL (ref 2.5–4.9)
PLATELET # BLD AUTO: 217 X10*3/UL (ref 150–450)
POTASSIUM SERPL-SCNC: 3.2 MMOL/L (ref 3.5–5.3)
PROT SERPL-MCNC: 7.5 G/DL (ref 6.4–8.2)
RBC # BLD AUTO: 3.92 X10*6/UL (ref 4–5.2)
SODIUM SERPL-SCNC: 146 MMOL/L (ref 136–145)
WBC # BLD AUTO: 9 X10*3/UL (ref 4.4–11.3)

## 2025-07-16 PROCEDURE — 82947 ASSAY GLUCOSE BLOOD QUANT: CPT

## 2025-07-16 PROCEDURE — 96361 HYDRATE IV INFUSION ADD-ON: CPT

## 2025-07-16 PROCEDURE — 84100 ASSAY OF PHOSPHORUS: CPT

## 2025-07-16 PROCEDURE — 51798 US URINE CAPACITY MEASURE: CPT

## 2025-07-16 PROCEDURE — 99285 EMERGENCY DEPT VISIT HI MDM: CPT | Performed by: EMERGENCY MEDICINE

## 2025-07-16 PROCEDURE — 36415 COLL VENOUS BLD VENIPUNCTURE: CPT

## 2025-07-16 PROCEDURE — 71046 X-RAY EXAM CHEST 2 VIEWS: CPT | Performed by: RADIOLOGY

## 2025-07-16 PROCEDURE — 85025 COMPLETE CBC W/AUTO DIFF WBC: CPT

## 2025-07-16 PROCEDURE — 2500000004 HC RX 250 GENERAL PHARMACY W/ HCPCS (ALT 636 FOR OP/ED)

## 2025-07-16 PROCEDURE — 80053 COMPREHEN METABOLIC PANEL: CPT

## 2025-07-16 PROCEDURE — 70450 CT HEAD/BRAIN W/O DYE: CPT

## 2025-07-16 PROCEDURE — 84484 ASSAY OF TROPONIN QUANT: CPT

## 2025-07-16 PROCEDURE — 71046 X-RAY EXAM CHEST 2 VIEWS: CPT

## 2025-07-16 PROCEDURE — 99285 EMERGENCY DEPT VISIT HI MDM: CPT | Mod: 25 | Performed by: EMERGENCY MEDICINE

## 2025-07-16 PROCEDURE — 96360 HYDRATION IV INFUSION INIT: CPT

## 2025-07-16 PROCEDURE — 70450 CT HEAD/BRAIN W/O DYE: CPT | Performed by: RADIOLOGY

## 2025-07-16 RX ORDER — POTASSIUM CHLORIDE 1.5 G/1.58G
20 POWDER, FOR SOLUTION ORAL ONCE
Status: COMPLETED | OUTPATIENT
Start: 2025-07-16 | End: 2025-07-17

## 2025-07-16 RX ADMIN — SODIUM CHLORIDE, SODIUM LACTATE, POTASSIUM CHLORIDE, AND CALCIUM CHLORIDE 500 ML: .6; .31; .03; .02 INJECTION, SOLUTION INTRAVENOUS at 23:01

## 2025-07-16 ASSESSMENT — LIFESTYLE VARIABLES
TOTAL SCORE: 0
HAVE YOU EVER FELT YOU SHOULD CUT DOWN ON YOUR DRINKING: NO
EVER FELT BAD OR GUILTY ABOUT YOUR DRINKING: NO
EVER HAD A DRINK FIRST THING IN THE MORNING TO STEADY YOUR NERVES TO GET RID OF A HANGOVER: NO
HAVE PEOPLE ANNOYED YOU BY CRITICIZING YOUR DRINKING: NO

## 2025-07-16 ASSESSMENT — PAIN SCALES - GENERAL: PAINLEVEL_OUTOF10: 0 - NO PAIN

## 2025-07-16 ASSESSMENT — PAIN - FUNCTIONAL ASSESSMENT: PAIN_FUNCTIONAL_ASSESSMENT: 0-10

## 2025-07-16 NOTE — ED TRIAGE NOTES
BIB [facility], Per patients family she has been lethargic for about 3x days, per EMS family states that she is more confused that her baseline. During triage patient was a&o 2,  and patient has no complaints at this time.

## 2025-07-16 NOTE — ED PROVIDER NOTES
History of Present Illness     History provided by: Patient and Family Member  Limitations to History: Patient has poor recollection of her own medical history.  Family appears reliable based on who I spoke with but appears to have limited medical literacy  External Records Reviewed with Brief Summary: None    HPI:  Jamila Eric is a 89 y.o. female with medical history notable for hypertension, hypothyroidism and taking methimazole due to prior Graves' disease.  Also has CKD.  She is here with family with concern for confusion, she is normally ANO x 2 which is consistent with today.  Family does not report any changes in orientation but states that they are concerned that she may be more confused than usual.  1 family member noted that they are concerned that family last night may have given too much NyQuil as patient has been complaining of cough and congestion for the last couple of days, but she has otherwise had no complaints.      Has not been complaining of chest pain, shortness of breath, abdominal pain, nausea, vomiting, otherwise recent illness beyond cough, changes in bowel or bladder function, dysuria.  This patient reports the same negative ROS    Patient only does note that they were concerned the patient had a fever as she was sweaty but had not measured a temperature, she was given Tylenol around 4 PM today.    After speaking with another family member they noted that the primary concern was that patient was somewhat more difficult to wake this morning but not truly lethargic and was still waking up.  They were also concerned that she was talking in her sleep which was the true primary reason to believe that patient was altered          Physical Exam   Triage vitals:  T 37.2 °C (99 °F)  HR 83  /83  RR 16  O2 95 % None (Room air)    General: Awake, alert, in no acute distress  Eyes: Gaze conjugate.  No scleral icterus or injection, PERRL  HENT: Normo-cephalic, atraumatic. No stridor   CV:  Regular rate, regular rhythm. Radial and DP pulses 2+ bilaterally. Heart sounds are normal  Resp: Breathing non-labored, speaking in full sentences.  Clear to auscultation bilaterally.  Patient coughed frequently while in the ED but dry and nonproductive  Chest: non-tender  GI: Soft, non-distended, non-tender. No rebound or guarding.  MSK/Extremities: No gross bony deformities. Moving all extremities  Skin: Warm. Appropriate color  Neuro: EOMI, PERRL, facial sensation is intact throughout. All facial movements intact including eyebrow raise, puff cheeks, and smile.  Hearing intact bilaterally, intact swallow, speech is clear and fluent, no dysarthria, shoulder shrug and head turn is intact with 5/5 strength bilaterally, tongue protrusion and movements intact.  Appropriate strength and sensation throughout the BUEs and BLEs  A+ O x 2 -patient is unable to tell me the month or year but is oriented to self, is correct about her age, and is able to explain that she is in a hospital  Psych: Appropriate mood and affect      Medical Decision Making & ED Course   Medical Decision Makin y.o. female with concern for altered mentation which is not truly seen as patient appears to be at her baseline, she is at her usual orientation, she is far beyond 6 hours from her last NyQuil dose as it was last taken last night.  Patient is coughing and has reported fever, may have viral upper respiratory infection but CXR was negative as was lung exam.  Obtained CT of the head which was normal, otherwise her lab workup was also largely normal and consistent with her expected level disease.  Only currently awaiting urinary analysis at the time of my signout to Dr. Yusuf.  Low suspicion that this patient is truly altered in any way as family's concerns that patient was talking in her sleep or slightly more difficult to arouse are not significantly abnormal signs especially in this patient with known dementia/vascular dementia.      Do not suspect any acute decompensation, excessive medications.  Lab workup was all not concerning and patient has had appropriate washout time if there were any true access of Tylenol or antihistamine which would ultimately be concerning for anticholinergic symptoms do not present and possible electrolyte abnormalities again which are not present.  Patient does have some signs of being mildly dehydrated, will give her small bolus of fluid to assist with this but do not feel that she needs to remain in the hospital assuming she has a normal urine analysis    ED Course:         ---       Social Determinants of Health which Significantly Impact Care: None identified     EKG Independent Interpretation: EKG obtained today and interpreted by me shows normal sinus rhythm with a rate of 60 bpm.  Upright axis with normal intervals.  There are T wave inversions in V4, V5, V6 as well as aVL.  No ST changes concerning for any ischemia, no STEMI.  T wave changes are seen previously, consistent with baseline.  No acute concerns    The patient was discussed with the following consultants/services: None    Independent Result Review and Interpretation: Relevant laboratory and radiographic results were reviewed and independently interpreted by myself.  As necessary, they are commented on in the ED Course.    Chronic conditions affecting the patient's care: As documented above in MDM    Care Considerations: As documented above in MDM    Disposition   Patient was signed out to Dr. Yusuf at 2300 pending completion of their work-up.  Please see the next provider's transition of care note for the remainder of the patient's care.     Procedures   Procedures    This was a shared visit with an ED attending.  The patient was seen and discussed with the ED attending    Herson Johnson MD  Emergency Medicine     Herson Johnson MD  Resident  07/17/25 [id number]

## 2025-07-17 ENCOUNTER — CLINICAL SUPPORT (OUTPATIENT)
Dept: EMERGENCY MEDICINE | Facility: HOSPITAL | Age: OVER 89
End: 2025-07-17
Payer: MEDICARE

## 2025-07-17 VITALS
DIASTOLIC BLOOD PRESSURE: 90 MMHG | HEIGHT: 62 IN | HEART RATE: 68 BPM | TEMPERATURE: 98 F | WEIGHT: 110 LBS | BODY MASS INDEX: 20.24 KG/M2 | SYSTOLIC BLOOD PRESSURE: 138 MMHG | OXYGEN SATURATION: 97 % | RESPIRATION RATE: 17 BRPM

## 2025-07-17 LAB
APPEARANCE UR: CLEAR
ATRIAL RATE: 64 BPM
BILIRUB UR STRIP.AUTO-MCNC: NEGATIVE MG/DL
COLOR UR: YELLOW
GLUCOSE UR STRIP.AUTO-MCNC: NORMAL MG/DL
HOLD SPECIMEN: NORMAL
KETONES UR STRIP.AUTO-MCNC: NEGATIVE MG/DL
LEUKOCYTE ESTERASE UR QL STRIP.AUTO: NEGATIVE
NITRITE UR QL STRIP.AUTO: NEGATIVE
P AXIS: 59 DEGREES
P OFFSET: 187 MS
P ONSET: 140 MS
PH UR STRIP.AUTO: 8 [PH]
PR INTERVAL: 166 MS
PROT UR STRIP.AUTO-MCNC: ABNORMAL MG/DL
Q ONSET: 223 MS
QRS COUNT: 10 BEATS
QRS DURATION: 70 MS
QT INTERVAL: 440 MS
QTC CALCULATION(BAZETT): 453 MS
QTC FREDERICIA: 449 MS
R AXIS: 11 DEGREES
RBC # UR STRIP.AUTO: NEGATIVE MG/DL
RBC #/AREA URNS AUTO: NORMAL /HPF
SP GR UR STRIP.AUTO: 1.02
T AXIS: 183 DEGREES
T OFFSET: 443 MS
UROBILINOGEN UR STRIP.AUTO-MCNC: ABNORMAL MG/DL
VENTRICULAR RATE: 64 BPM
WBC #/AREA URNS AUTO: NORMAL /HPF

## 2025-07-17 PROCEDURE — 81001 URINALYSIS AUTO W/SCOPE: CPT

## 2025-07-17 PROCEDURE — 93005 ELECTROCARDIOGRAM TRACING: CPT

## 2025-07-17 PROCEDURE — 2500000001 HC RX 250 WO HCPCS SELF ADMINISTERED DRUGS (ALT 637 FOR MEDICARE OP)

## 2025-07-17 RX ADMIN — POTASSIUM CHLORIDE 20 MEQ: 1.5 POWDER, FOR SOLUTION ORAL at 02:01

## 2025-07-17 NOTE — PROGRESS NOTES
Emergency Department Transition of Care Note       Signout   I received Jamila Eric in signout from Dr. Johnson.  Please see the ED Provider Note for all HPI, PE and MDM up to the time of signout at 2300.  This is in addition to the primary record.    In brief Jamila Eric is an 89 y.o. female presenting for altered mental status per the family.    At the time of signout we were awaiting:  UA and reevaluation.     ED Course & Medical Decision Making   Medical Decision Making:  Under my care,   Patient given oral potassium replacement.  Urinalysis revealed no acute abnormalities.  Patient alert and oriented x 4.  Patient discharged home given appropriate return precautions.    ED Course:  Diagnoses as of 07/17/25 0803   Altered mental status, unspecified altered mental status type       Disposition   As a result of the work-up, the patient was discharged home.  she was informed of her diagnosis and instructed to come back with any concerns or worsening of condition.  she and was agreeable to the plan as discussed above.  she was given the opportunity to ask questions.  All of the patient's questions were answered.    Procedures   Procedures    Patient seen and discussed with ED attending physician.    Gallo Yusuf, DO  Emergency Medicine

## 2025-07-22 ENCOUNTER — APPOINTMENT (OUTPATIENT)
Dept: PHARMACY | Facility: HOSPITAL | Age: OVER 89
End: 2025-07-22
Payer: MEDICARE

## 2025-08-01 ENCOUNTER — APPOINTMENT (OUTPATIENT)
Dept: OPHTHALMOLOGY | Facility: CLINIC | Age: OVER 89
End: 2025-08-01
Payer: MEDICARE

## 2025-08-05 ENCOUNTER — APPOINTMENT (OUTPATIENT)
Dept: PHARMACY | Facility: HOSPITAL | Age: OVER 89
End: 2025-08-05
Payer: MEDICARE

## 2025-08-19 ENCOUNTER — APPOINTMENT (OUTPATIENT)
Dept: PHARMACY | Facility: HOSPITAL | Age: OVER 89
End: 2025-08-19
Payer: MEDICARE

## 2025-08-19 DIAGNOSIS — N18.32 STAGE 3B CHRONIC KIDNEY DISEASE (MULTI): ICD-10-CM

## 2025-08-19 DIAGNOSIS — I10 ESSENTIAL (PRIMARY) HYPERTENSION: ICD-10-CM

## 2025-08-19 DIAGNOSIS — E05.90 HYPERTHYROIDISM: ICD-10-CM

## 2025-10-28 ENCOUNTER — APPOINTMENT (OUTPATIENT)
Dept: PHARMACY | Facility: HOSPITAL | Age: OVER 89
End: 2025-10-28
Payer: MEDICARE

## 2025-11-20 ENCOUNTER — APPOINTMENT (OUTPATIENT)
Dept: NEPHROLOGY | Facility: CLINIC | Age: OVER 89
End: 2025-11-20
Payer: MEDICARE